# Patient Record
Sex: FEMALE | Race: WHITE | Employment: UNEMPLOYED | URBAN - METROPOLITAN AREA
[De-identification: names, ages, dates, MRNs, and addresses within clinical notes are randomized per-mention and may not be internally consistent; named-entity substitution may affect disease eponyms.]

---

## 2018-04-03 DIAGNOSIS — J01.90 ACUTE SINUSITIS, RECURRENCE NOT SPECIFIED, UNSPECIFIED LOCATION: ICD-10-CM

## 2018-04-03 RX ORDER — AZELASTINE 1 MG/ML
2 SPRAY, METERED NASAL 2 TIMES DAILY
Qty: 1 BOTTLE | Refills: 3 | Status: SHIPPED | OUTPATIENT
Start: 2018-04-03 | End: 2018-10-10 | Stop reason: SDUPTHER

## 2018-04-18 ENCOUNTER — OFFICE VISIT (OUTPATIENT)
Dept: FAMILY MEDICINE CLINIC | Age: 63
End: 2018-04-18
Payer: COMMERCIAL

## 2018-04-18 ENCOUNTER — HOSPITAL ENCOUNTER (OUTPATIENT)
Age: 63
Discharge: HOME | End: 2018-04-20
Payer: COMMERCIAL

## 2018-04-18 VITALS
WEIGHT: 140 LBS | RESPIRATION RATE: 18 BRPM | DIASTOLIC BLOOD PRESSURE: 78 MMHG | HEIGHT: 59 IN | OXYGEN SATURATION: 97 % | BODY MASS INDEX: 28.22 KG/M2 | SYSTOLIC BLOOD PRESSURE: 118 MMHG | HEART RATE: 70 BPM

## 2018-04-18 DIAGNOSIS — L98.9 SKIN LESION: ICD-10-CM

## 2018-04-18 DIAGNOSIS — Z98.890 HISTORY OF NISSEN FUNDOPLICATION: ICD-10-CM

## 2018-04-18 DIAGNOSIS — G47.00 INSOMNIA, UNSPECIFIED TYPE: Primary | ICD-10-CM

## 2018-04-18 DIAGNOSIS — I10 ESSENTIAL HYPERTENSION: ICD-10-CM

## 2018-04-18 DIAGNOSIS — M81.0 OSTEOPOROSIS, UNSPECIFIED OSTEOPOROSIS TYPE, UNSPECIFIED PATHOLOGICAL FRACTURE PRESENCE: ICD-10-CM

## 2018-04-18 DIAGNOSIS — Z79.899 ENCOUNTER FOR LONG-TERM CURRENT USE OF MEDICATION: ICD-10-CM

## 2018-04-18 DIAGNOSIS — K83.4 SPHINCTER OF ODDI DYSFUNCTION: ICD-10-CM

## 2018-04-18 LAB
AMPHETAMINE SCREEN, URINE: NOT DETECTED
BARBITURATE SCREEN URINE: NOT DETECTED
BENZODIAZEPINE SCREEN, URINE: NOT DETECTED
CANNABINOID SCREEN URINE: NOT DETECTED
COCAINE METABOLITE SCREEN URINE: NOT DETECTED
METHADONE SCREEN, URINE: NOT DETECTED
OPIATE SCREEN URINE: NOT DETECTED
PHENCYCLIDINE SCREEN URINE: NOT DETECTED
PROPOXYPHENE SCREEN: NOT DETECTED

## 2018-04-18 PROCEDURE — 99214 OFFICE O/P EST MOD 30 MIN: CPT | Performed by: FAMILY MEDICINE

## 2018-04-18 PROCEDURE — 80307 DRUG TEST PRSMV CHEM ANLYZR: CPT

## 2018-04-18 RX ORDER — ZOLPIDEM TARTRATE 10 MG/1
10 TABLET ORAL NIGHTLY PRN
Qty: 90 TABLET | Refills: 0 | Status: SHIPPED | OUTPATIENT
Start: 2018-04-18 | End: 2018-07-13 | Stop reason: SDUPTHER

## 2018-04-18 ASSESSMENT — ENCOUNTER SYMPTOMS
SHORTNESS OF BREATH: 0
PHOTOPHOBIA: 0
SORE THROAT: 0
SINUS PAIN: 0
EYE REDNESS: 0
EYE PAIN: 0
WHEEZING: 0
EYES NEGATIVE: 1
SPUTUM PRODUCTION: 0
DOUBLE VISION: 0
NAUSEA: 0
ORTHOPNEA: 0
EYE DISCHARGE: 0
HEARTBURN: 0
STRIDOR: 0
DIARRHEA: 0
VOMITING: 0
BLOOD IN STOOL: 0
CONSTIPATION: 0
COUGH: 0
BLURRED VISION: 0
HEMOPTYSIS: 0

## 2018-04-19 ASSESSMENT — PATIENT HEALTH QUESTIONNAIRE - PHQ9
2. FEELING DOWN, DEPRESSED OR HOPELESS: 0
SUM OF ALL RESPONSES TO PHQ QUESTIONS 1-9: 0
1. LITTLE INTEREST OR PLEASURE IN DOING THINGS: 0
SUM OF ALL RESPONSES TO PHQ9 QUESTIONS 1 & 2: 0

## 2018-07-13 ENCOUNTER — OFFICE VISIT (OUTPATIENT)
Dept: FAMILY MEDICINE CLINIC | Age: 63
End: 2018-07-13
Payer: COMMERCIAL

## 2018-07-13 ENCOUNTER — HOSPITAL ENCOUNTER (OUTPATIENT)
Age: 63
Discharge: HOME | End: 2018-07-15
Payer: COMMERCIAL

## 2018-07-13 VITALS
BODY MASS INDEX: 28.43 KG/M2 | OXYGEN SATURATION: 98 % | WEIGHT: 141 LBS | HEIGHT: 59 IN | RESPIRATION RATE: 18 BRPM | DIASTOLIC BLOOD PRESSURE: 70 MMHG | SYSTOLIC BLOOD PRESSURE: 112 MMHG | HEART RATE: 69 BPM

## 2018-07-13 DIAGNOSIS — R73.01 IFG (IMPAIRED FASTING GLUCOSE): ICD-10-CM

## 2018-07-13 DIAGNOSIS — G47.00 INSOMNIA, UNSPECIFIED TYPE: ICD-10-CM

## 2018-07-13 DIAGNOSIS — Z79.899 ENCOUNTER FOR LONG-TERM CURRENT USE OF MEDICATION: ICD-10-CM

## 2018-07-13 DIAGNOSIS — I10 ESSENTIAL HYPERTENSION: Primary | ICD-10-CM

## 2018-07-13 DIAGNOSIS — R53.83 FATIGUE, UNSPECIFIED TYPE: ICD-10-CM

## 2018-07-13 DIAGNOSIS — M17.11 PRIMARY OSTEOARTHRITIS OF RIGHT KNEE: ICD-10-CM

## 2018-07-13 DIAGNOSIS — E78.5 DYSLIPIDEMIA: ICD-10-CM

## 2018-07-13 PROCEDURE — 99214 OFFICE O/P EST MOD 30 MIN: CPT | Performed by: FAMILY MEDICINE

## 2018-07-13 PROCEDURE — 80307 DRUG TEST PRSMV CHEM ANLYZR: CPT

## 2018-07-13 RX ORDER — ZOLPIDEM TARTRATE 10 MG/1
10 TABLET ORAL NIGHTLY PRN
Qty: 90 TABLET | Refills: 0 | Status: SHIPPED | OUTPATIENT
Start: 2018-07-13 | End: 2018-10-10 | Stop reason: SDUPTHER

## 2018-07-13 RX ORDER — ATENOLOL 25 MG/1
25 TABLET ORAL 2 TIMES DAILY
Qty: 180 TABLET | Refills: 1 | Status: SHIPPED | OUTPATIENT
Start: 2018-07-13 | End: 2018-12-10 | Stop reason: SDUPTHER

## 2018-07-13 ASSESSMENT — ENCOUNTER SYMPTOMS
SPUTUM PRODUCTION: 0
STRIDOR: 0
VOMITING: 0
BLURRED VISION: 0
BLOOD IN STOOL: 0
EYES NEGATIVE: 1
HEMOPTYSIS: 0
SHORTNESS OF BREATH: 0
DOUBLE VISION: 0
PHOTOPHOBIA: 0
SINUS PAIN: 0
HEARTBURN: 0
DIARRHEA: 0
NAUSEA: 0
ORTHOPNEA: 0
CONSTIPATION: 0
ABDOMINAL PAIN: 0
WHEEZING: 0
COUGH: 0
SORE THROAT: 0
BACK PAIN: 0
EYE PAIN: 0
EYE REDNESS: 0
EYE DISCHARGE: 0

## 2018-07-13 ASSESSMENT — PATIENT HEALTH QUESTIONNAIRE - PHQ9
SUM OF ALL RESPONSES TO PHQ9 QUESTIONS 1 & 2: 0
2. FEELING DOWN, DEPRESSED OR HOPELESS: 0
SUM OF ALL RESPONSES TO PHQ QUESTIONS 1-9: 0
1. LITTLE INTEREST OR PLEASURE IN DOING THINGS: 0

## 2018-07-13 NOTE — PROGRESS NOTES
Tamy Avilez is a 58 y.o. female. HPI/Chief C/O:  Chief Complaint   Patient presents with    Insomnia     Pt here for refill of Ambien- UDS pended    Other     Pt would like to discuss getting a \"Life Scan\"- states that she saw a commercial for this on TV and wants to know if she should go do it     Allergies   Allergen Reactions    Molds & Smuts Swelling    Meperidine Nausea And Vomiting     She is here for a med list review and refills      Hypertension   This is a chronic problem. The current episode started more than 1 year ago. The problem is controlled. Associated symptoms include anxiety and malaise/fatigue. Pertinent negatives include no blurred vision, chest pain, headaches, neck pain, orthopnea, palpitations, peripheral edema, PND, shortness of breath or sweats. Risk factors for coronary artery disease include post-menopausal state and family history. Past treatments include lifestyle changes and beta blockers. The current treatment provides significant improvement. Compliance problems include exercise and diet. There is no history of angina, kidney disease, CAD/MI, CVA, heart failure, left ventricular hypertrophy, PVD or retinopathy. There is no history of chronic renal disease, coarctation of the aorta, hyperaldosteronism, hypercortisolism, hyperparathyroidism, a hypertension causing med, pheochromocytoma, renovascular disease, sleep apnea or a thyroid problem. ROS:  Review of Systems   Constitutional: Positive for malaise/fatigue. Negative for chills, diaphoresis, fever and weight loss. HENT: Negative for congestion, ear discharge, ear pain, hearing loss, nosebleeds, sinus pain, sore throat and tinnitus. Eyes: Negative. Negative for blurred vision, double vision, photophobia, pain, discharge and redness. Respiratory: Negative for cough, hemoptysis, sputum production, shortness of breath, wheezing and stridor. Cardiovascular: Negative.   Negative for chest pain, palpitations, orthopnea, claudication, leg swelling and PND. Gastrointestinal: Negative for abdominal pain, blood in stool, constipation, diarrhea, heartburn, melena, nausea and vomiting. Genitourinary: Negative. Negative for flank pain, frequency, hematuria and urgency. Musculoskeletal: Positive for joint pain (right knee pain). Negative for back pain, falls, myalgias and neck pain. Skin: Negative for itching and rash. Neurological: Positive for weakness. Negative for dizziness, tingling, tremors, sensory change, speech change, focal weakness, seizures, loss of consciousness and headaches. Endo/Heme/Allergies: Negative. Negative for environmental allergies and polydipsia. Does not bruise/bleed easily. Psychiatric/Behavioral: Positive for depression. Negative for hallucinations, memory loss, substance abuse and suicidal ideas. The patient is nervous/anxious. The patient does not have insomnia. Past Medical/Surgical Hx;  Reviewed with patient      Diagnosis Date    Asthma     GERD (gastroesophageal reflux disease)     Hernia     Hypertension      Past Surgical History:   Procedure Laterality Date    CARPAL TUNNEL RELEASE       SECTION      LASIK      NASAL SEPTUM SURGERY         Past Family Hx:  Reviewed with patient  Family History   Problem Relation Age of Onset    Stroke Mother     Heart Disease Mother     Heart Disease Father     Arthritis Sister     Glaucoma Brother        Social Hx:  Reviewed with patient  Social History   Substance Use Topics    Smoking status: Never Smoker    Smokeless tobacco: Never Used    Alcohol use No       OBJECTIVE  /70   Pulse 69   Resp 18   Ht 4' 11\" (1.499 m)   Wt 141 lb (64 kg)   LMP  (LMP Unknown)   SpO2 98%   Breastfeeding? No   BMI 28.48 kg/m²     Problem List:  Eunice  does not have any pertinent problems on file. PHYS EX:  Physical Exam   Constitutional: She is oriented to person, place, and time.  She appears

## 2018-09-24 LAB
ALBUMIN SERPL-MCNC: NORMAL G/DL
ALP BLD-CCNC: NORMAL U/L
ALT SERPL-CCNC: NORMAL U/L
ANION GAP SERPL CALCULATED.3IONS-SCNC: NORMAL MMOL/L
AST SERPL-CCNC: NORMAL U/L
BASOPHILS ABSOLUTE: NORMAL /ΜL
BASOPHILS RELATIVE PERCENT: NORMAL %
BILIRUB SERPL-MCNC: NORMAL MG/DL (ref 0.1–1.4)
BUN BLDV-MCNC: NORMAL MG/DL
CALCIUM SERPL-MCNC: NORMAL MG/DL
CHLORIDE BLD-SCNC: NORMAL MMOL/L
CHOLESTEROL, TOTAL: 178 MG/DL
CHOLESTEROL/HDL RATIO: 2.74
CO2: NORMAL MMOL/L
CREAT SERPL-MCNC: NORMAL MG/DL
EOSINOPHILS ABSOLUTE: NORMAL /ΜL
EOSINOPHILS RELATIVE PERCENT: NORMAL %
GFR CALCULATED: NORMAL
GLUCOSE BLD-MCNC: NORMAL MG/DL
HCT VFR BLD CALC: NORMAL % (ref 36–46)
HDLC SERPL-MCNC: 65 MG/DL (ref 35–70)
HEMOGLOBIN: NORMAL G/DL (ref 12–16)
LDL CHOLESTEROL CALCULATED: 101 MG/DL (ref 0–160)
LYMPHOCYTES ABSOLUTE: NORMAL /ΜL
LYMPHOCYTES RELATIVE PERCENT: NORMAL %
MCH RBC QN AUTO: NORMAL PG
MCHC RBC AUTO-ENTMCNC: NORMAL G/DL
MCV RBC AUTO: NORMAL FL
MONOCYTES ABSOLUTE: NORMAL /ΜL
MONOCYTES RELATIVE PERCENT: NORMAL %
NEUTROPHILS ABSOLUTE: NORMAL /ΜL
NEUTROPHILS RELATIVE PERCENT: NORMAL %
PDW BLD-RTO: NORMAL %
PLATELET # BLD: NORMAL K/ΜL
PMV BLD AUTO: NORMAL FL
POTASSIUM SERPL-SCNC: NORMAL MMOL/L
RBC # BLD: NORMAL 10^6/ΜL
SODIUM BLD-SCNC: NORMAL MMOL/L
TOTAL PROTEIN: NORMAL
TRIGL SERPL-MCNC: 62 MG/DL
TSH SERPL DL<=0.05 MIU/L-ACNC: NORMAL UIU/ML
VLDLC SERPL CALC-MCNC: NORMAL MG/DL
WBC # BLD: NORMAL 10^3/ML

## 2018-09-25 LAB
AVERAGE GLUCOSE: NORMAL
HBA1C MFR BLD: 5.1 %

## 2018-09-26 DIAGNOSIS — R53.83 FATIGUE, UNSPECIFIED TYPE: ICD-10-CM

## 2018-09-26 DIAGNOSIS — E78.5 DYSLIPIDEMIA: ICD-10-CM

## 2018-09-26 DIAGNOSIS — I10 ESSENTIAL HYPERTENSION: ICD-10-CM

## 2018-09-26 DIAGNOSIS — R73.01 IFG (IMPAIRED FASTING GLUCOSE): ICD-10-CM

## 2018-10-10 ENCOUNTER — HOSPITAL ENCOUNTER (OUTPATIENT)
Age: 63
Discharge: HOME | End: 2018-10-12
Payer: COMMERCIAL

## 2018-10-10 ENCOUNTER — OFFICE VISIT (OUTPATIENT)
Dept: FAMILY MEDICINE CLINIC | Age: 63
End: 2018-10-10
Payer: COMMERCIAL

## 2018-10-10 VITALS
WEIGHT: 137.6 LBS | SYSTOLIC BLOOD PRESSURE: 126 MMHG | BODY MASS INDEX: 27.74 KG/M2 | HEIGHT: 59 IN | HEART RATE: 66 BPM | RESPIRATION RATE: 18 BRPM | OXYGEN SATURATION: 97 % | DIASTOLIC BLOOD PRESSURE: 66 MMHG

## 2018-10-10 DIAGNOSIS — I10 ESSENTIAL HYPERTENSION: Primary | ICD-10-CM

## 2018-10-10 DIAGNOSIS — Z79.899 ENCOUNTER FOR LONG-TERM CURRENT USE OF MEDICATION: ICD-10-CM

## 2018-10-10 DIAGNOSIS — J32.9 SINUSITIS, UNSPECIFIED CHRONICITY, UNSPECIFIED LOCATION: ICD-10-CM

## 2018-10-10 DIAGNOSIS — Z23 IMMUNIZATION DUE: ICD-10-CM

## 2018-10-10 DIAGNOSIS — G47.00 INSOMNIA, UNSPECIFIED TYPE: ICD-10-CM

## 2018-10-10 DIAGNOSIS — J01.90 ACUTE SINUSITIS, RECURRENCE NOT SPECIFIED, UNSPECIFIED LOCATION: ICD-10-CM

## 2018-10-10 PROCEDURE — 90471 IMMUNIZATION ADMIN: CPT | Performed by: FAMILY MEDICINE

## 2018-10-10 PROCEDURE — 90686 IIV4 VACC NO PRSV 0.5 ML IM: CPT | Performed by: FAMILY MEDICINE

## 2018-10-10 PROCEDURE — 80307 DRUG TEST PRSMV CHEM ANLYZR: CPT

## 2018-10-10 PROCEDURE — 90472 IMMUNIZATION ADMIN EACH ADD: CPT | Performed by: FAMILY MEDICINE

## 2018-10-10 PROCEDURE — 90732 PPSV23 VACC 2 YRS+ SUBQ/IM: CPT | Performed by: FAMILY MEDICINE

## 2018-10-10 PROCEDURE — 99214 OFFICE O/P EST MOD 30 MIN: CPT | Performed by: FAMILY MEDICINE

## 2018-10-10 RX ORDER — FLUTICASONE PROPIONATE 50 MCG
1 SPRAY, SUSPENSION (ML) NASAL DAILY
Qty: 1 BOTTLE | Refills: 3 | Status: SHIPPED | OUTPATIENT
Start: 2018-10-10 | End: 2019-01-11 | Stop reason: CLARIF

## 2018-10-10 RX ORDER — AZELASTINE 1 MG/ML
2 SPRAY, METERED NASAL 2 TIMES DAILY
Qty: 1 BOTTLE | Refills: 3 | Status: SHIPPED | OUTPATIENT
Start: 2018-10-10 | End: 2019-04-16 | Stop reason: SDUPTHER

## 2018-10-10 RX ORDER — ZOLPIDEM TARTRATE 10 MG/1
10 TABLET ORAL NIGHTLY PRN
Qty: 90 TABLET | Refills: 0 | Status: SHIPPED | OUTPATIENT
Start: 2018-10-10 | End: 2019-01-11 | Stop reason: SDUPTHER

## 2018-10-10 ASSESSMENT — ENCOUNTER SYMPTOMS
BLOOD IN STOOL: 0
EYE REDNESS: 0
DIARRHEA: 0
BACK PAIN: 0
EYE PAIN: 0
DOUBLE VISION: 0
HEARTBURN: 0
SORE THROAT: 0
WHEEZING: 0
EYE DISCHARGE: 0
SINUS PAIN: 0
ABDOMINAL PAIN: 0
SPUTUM PRODUCTION: 0
COUGH: 0
BLURRED VISION: 0
NAUSEA: 0
SHORTNESS OF BREATH: 0
ORTHOPNEA: 0
EYES NEGATIVE: 1
CONSTIPATION: 0
VOMITING: 0
STRIDOR: 0
PHOTOPHOBIA: 0
HEMOPTYSIS: 0

## 2018-10-10 ASSESSMENT — PATIENT HEALTH QUESTIONNAIRE - PHQ9
SUM OF ALL RESPONSES TO PHQ9 QUESTIONS 1 & 2: 0
SUM OF ALL RESPONSES TO PHQ QUESTIONS 1-9: 0
1. LITTLE INTEREST OR PLEASURE IN DOING THINGS: 0
2. FEELING DOWN, DEPRESSED OR HOPELESS: 0
SUM OF ALL RESPONSES TO PHQ QUESTIONS 1-9: 0

## 2018-10-10 NOTE — PATIENT INSTRUCTIONS
Patient Education        Insomnia: Care Instructions  Your Care Instructions    Insomnia is the inability to sleep well. It is a common problem for most people at some time. Insomnia may make it hard for you to get to sleep, stay asleep, or sleep as long as you need to. This can make you tired and grouchy during the day. It can also make you forgetful, less effective at work, and unhappy. Insomnia can be caused by conditions such as depression or anxiety. Pain can also affect your ability to sleep. When these problems are solved, the insomnia usually clears up. But sometimes bad sleep habits can cause insomnia. If insomnia is affecting your work or your enjoyment of life, you can take steps to improve your sleep. Follow-up care is a key part of your treatment and safety. Be sure to make and go to all appointments, and call your doctor if you are having problems. It's also a good idea to know your test results and keep a list of the medicines you take. How can you care for yourself at home? What to avoid  · Do not have drinks with caffeine, such as coffee or black tea, for 8 hours before bed. · Do not smoke or use other types of tobacco near bedtime. Nicotine is a stimulant and can keep you awake. · Avoid drinking alcohol late in the evening, because it can cause you to wake in the middle of the night. · Do not eat a big meal close to bedtime. If you are hungry, eat a light snack. · Do not drink a lot of water close to bedtime, because the need to urinate may wake you up during the night. · Do not read or watch TV in bed. Use the bed only for sleeping and sexual activity. What to try  · Go to bed at the same time every night, and wake up at the same time every morning. Do not take naps during the day. · Keep your bedroom quiet, dark, and cool. · Sleep on a comfortable pillow and mattress. · If watching the clock makes you anxious, turn it facing away from you so you cannot see the time.   · If you nightly sleep time is 7½ to 8 hours. Healthy adults may need a little more or a little less than this. Why is getting enough sleep important? Getting enough quality sleep is a basic part of good health. When your sleep suffers, your mood and your thoughts can suffer too. You may find yourself feeling more grumpy or stressed. Not getting enough sleep also can lead to serious problems, including injury, accidents, anxiety, and depression. What might cause poor sleeping? Many things can cause sleep problems, including:  · Stress. Stress can be caused by fear about a single event, such as giving a speech. Or you may have ongoing stress, such as worry about work or school. · Depression, anxiety, and other mental or emotional conditions. · Changes in your sleep habits or surroundings. This includes changes that happen where you sleep, such as noise, light, or sleeping in a different bed. It also includes changes in your sleep pattern, such as having jet lag or working a late shift. · Health problems, such as pain, breathing problems, and restless legs syndrome. · Lack of regular exercise. How can you help yourself? Here are some tips that may help you sleep more soundly and wake up feeling more refreshed. Your sleeping area  · Use your bedroom only for sleeping and sex. A bit of light reading may help you fall asleep. But if it doesn't, do your reading elsewhere in the house. Don't watch TV in bed. · Be sure your bed is big enough to stretch out comfortably, especially if you have a sleep partner. · Keep your bedroom quiet, dark, and cool. Use curtains, blinds, or a sleep mask to block out light. To block out noise, use earplugs, soothing music, or a \"white noise\" machine. Your evening and bedtime routine  · Create a relaxing bedtime routine. You might want to take a warm shower or bath, listen to soothing music, or drink a cup of noncaffeinated tea. · Go to bed at the same time every night.  And get up at

## 2018-10-10 NOTE — PROGRESS NOTES
time. She appears well-developed and well-nourished. No distress. HENT:   Head: Normocephalic and atraumatic. Right Ear: External ear normal.   Left Ear: External ear normal.   Nose: Nose normal.   Mouth/Throat: Oropharynx is clear and moist. No oropharyngeal exudate. Eyes: Right eye exhibits no discharge. Left eye exhibits no discharge. No scleral icterus. Neck: Normal range of motion. Neck supple. No JVD present. No tracheal deviation present. No thyromegaly present. Cardiovascular: Normal rate, regular rhythm and normal heart sounds. Exam reveals no gallop and no friction rub. No murmur heard. Pulmonary/Chest: Effort normal and breath sounds normal. No stridor. No respiratory distress. She has no wheezes. She has no rales. She exhibits no tenderness. Abdominal: Soft. Normal appearance and bowel sounds are normal. She exhibits no shifting dullness, no distension, no pulsatile liver, no fluid wave, no abdominal bruit, no ascites, no pulsatile midline mass and no mass. There is no hepatosplenomegaly, splenomegaly or hepatomegaly. There is no tenderness. There is no rigidity, no rebound, no guarding, no CVA tenderness, no tenderness at McBurney's point and negative Nguyen's sign. No hernia. Hernia confirmed negative in the ventral area, confirmed negative in the right inguinal area and confirmed negative in the left inguinal area. Musculoskeletal: She exhibits tenderness. She exhibits no edema or deformity. Right knee: She exhibits decreased range of motion and bony tenderness. She exhibits no swelling, no effusion, no ecchymosis, no deformity, no laceration, no erythema, normal alignment, no LCL laxity, normal patellar mobility, normal meniscus and no MCL laxity. Tenderness found. No medial joint line, no lateral joint line, no MCL, no LCL and no patellar tendon tenderness noted. Legs:  Lymphadenopathy:     She has no cervical adenopathy.    Neurological: She is alert and oriented to importance of regular Health Maintenance follow up  Health Maintenance   Topic    Hepatitis C screen     HIV screen     Shingles Vaccine (1 of 2 - 2 Dose Series)    Flu vaccine (1)    Breast cancer screen     Lipid screen     Colon cancer screen colonoscopy     DTaP/Tdap/Td vaccine (2 - Td)

## 2018-10-15 ENCOUNTER — TELEPHONE (OUTPATIENT)
Dept: FAMILY MEDICINE CLINIC | Age: 63
End: 2018-10-15

## 2018-10-15 RX ORDER — FEXOFENADINE HCL 180 MG/1
180 TABLET ORAL DAILY
Qty: 90 TABLET | Refills: 1 | Status: CANCELLED | OUTPATIENT
Start: 2018-10-15

## 2018-10-15 NOTE — TELEPHONE ENCOUNTER
Patient inquiring if Caity Williamson is covered under her insurance. She is not sure if she wants it to go to [facility] or [facility].

## 2018-10-18 ENCOUNTER — OFFICE VISIT (OUTPATIENT)
Dept: FAMILY MEDICINE CLINIC | Age: 63
End: 2018-10-18
Payer: COMMERCIAL

## 2018-10-18 VITALS
DIASTOLIC BLOOD PRESSURE: 78 MMHG | OXYGEN SATURATION: 97 % | HEIGHT: 59 IN | BODY MASS INDEX: 27.82 KG/M2 | RESPIRATION RATE: 16 BRPM | WEIGHT: 138 LBS | HEART RATE: 73 BPM | SYSTOLIC BLOOD PRESSURE: 114 MMHG | TEMPERATURE: 98.7 F

## 2018-10-18 DIAGNOSIS — L23.9 ALLERGIC CONTACT DERMATITIS, UNSPECIFIED TRIGGER: Primary | ICD-10-CM

## 2018-10-18 PROCEDURE — 99213 OFFICE O/P EST LOW 20 MIN: CPT | Performed by: PHYSICIAN ASSISTANT

## 2018-10-18 RX ORDER — TRIAMCINOLONE ACETONIDE 40 MG/ML
40 INJECTION, SUSPENSION INTRA-ARTICULAR; INTRAMUSCULAR ONCE
Status: COMPLETED | OUTPATIENT
Start: 2018-10-18 | End: 2018-10-18

## 2018-10-18 RX ORDER — TRIAMCINOLONE ACETONIDE 1 MG/G
CREAM TOPICAL
Qty: 15 G | Refills: 0 | Status: SHIPPED | OUTPATIENT
Start: 2018-10-18 | End: 2019-04-16 | Stop reason: CLARIF

## 2018-10-18 RX ADMIN — TRIAMCINOLONE ACETONIDE 40 MG: 40 INJECTION, SUSPENSION INTRA-ARTICULAR; INTRAMUSCULAR at 11:03

## 2018-10-18 NOTE — PROGRESS NOTES
Chief Complaint:   Rash (Red and itchy, on arms and neck. Tried Cortisone and Desitin x 5 days, had flu and pneumonia shot last week, went camping)      History of Present Illness   Source of history provided by: patient. Elijah Webb is a 58 y.o. old female who has a past medical history of:   Past Medical History:   Diagnosis Date    Asthma     GERD (gastroesophageal reflux disease)     Hernia     Hypertension    Presents to the [facility] care for evaluation of a rash to the neck, chest, and bilateral forearms x 5 days. Denies any known cause for the rash. Pt was recently camping and cleaning her camper. She also reports recently getting a flu shot and pneumonia shot as well. Reports associated erythema, mild burning, and pruritis. Denies any bleeding or drainage. Denies any lymphangitic streaking, fever, chills, HA , dyspnea, dysphagia, recent illness, myalgias, vomiting, or lethargy. ROS    Unless otherwise stated in this report or unable to obtain because of the patient's clinical or mental status as evidenced by the medical record, this patients's positive and negative responses for Review of Systems, constitutional, psych, eyes, ENT, cardiovascular, respiratory, gastrointestinal, neurological, genitourinary, musculoskeletal, integument systems and systems related to the presenting problem are either stated in the preceding or were not pertinent or were negative for the symptoms and/or complaints related to the medical problem. Past Surgical History:  has a past surgical history that includes Nasal septum surgery;  section; LASIK; and Carpal tunnel release. Social History:  reports that she has never smoked. She has never used smokeless tobacco. She reports that she does not drink alcohol. Family History: family history includes Arthritis in her sister; Glaucoma in her brother; Heart Disease in her father and mother; Stroke in her mother.    Allergies: Molds & smuts and

## 2018-12-10 DIAGNOSIS — I10 ESSENTIAL HYPERTENSION: ICD-10-CM

## 2018-12-10 RX ORDER — ATENOLOL 50 MG/1
50 TABLET ORAL DAILY
Qty: 90 TABLET | Refills: 1 | Status: SHIPPED | OUTPATIENT
Start: 2018-12-10 | End: 2019-01-11 | Stop reason: SDUPTHER

## 2019-01-11 ENCOUNTER — OFFICE VISIT (OUTPATIENT)
Dept: FAMILY MEDICINE CLINIC | Age: 64
End: 2019-01-11
Payer: COMMERCIAL

## 2019-01-11 ENCOUNTER — HOSPITAL ENCOUNTER (OUTPATIENT)
Age: 64
Discharge: HOME | End: 2019-01-13
Payer: COMMERCIAL

## 2019-01-11 VITALS
HEART RATE: 60 BPM | DIASTOLIC BLOOD PRESSURE: 82 MMHG | BODY MASS INDEX: 27.82 KG/M2 | SYSTOLIC BLOOD PRESSURE: 124 MMHG | RESPIRATION RATE: 18 BRPM | TEMPERATURE: 97.9 F | WEIGHT: 138 LBS | HEIGHT: 59 IN | OXYGEN SATURATION: 97 %

## 2019-01-11 DIAGNOSIS — Z79.899 ENCOUNTER FOR LONG-TERM CURRENT USE OF MEDICATION: ICD-10-CM

## 2019-01-11 DIAGNOSIS — M81.0 OSTEOPOROSIS, UNSPECIFIED OSTEOPOROSIS TYPE, UNSPECIFIED PATHOLOGICAL FRACTURE PRESENCE: ICD-10-CM

## 2019-01-11 DIAGNOSIS — R73.01 IFG (IMPAIRED FASTING GLUCOSE): ICD-10-CM

## 2019-01-11 DIAGNOSIS — E78.5 DYSLIPIDEMIA: ICD-10-CM

## 2019-01-11 DIAGNOSIS — I10 ESSENTIAL HYPERTENSION: Primary | ICD-10-CM

## 2019-01-11 DIAGNOSIS — Z12.31 SCREENING MAMMOGRAM, ENCOUNTER FOR: ICD-10-CM

## 2019-01-11 DIAGNOSIS — G47.00 INSOMNIA, UNSPECIFIED TYPE: ICD-10-CM

## 2019-01-11 DIAGNOSIS — R53.83 FATIGUE, UNSPECIFIED TYPE: ICD-10-CM

## 2019-01-11 PROCEDURE — 99213 OFFICE O/P EST LOW 20 MIN: CPT | Performed by: FAMILY MEDICINE

## 2019-01-11 PROCEDURE — 80307 DRUG TEST PRSMV CHEM ANLYZR: CPT

## 2019-01-11 RX ORDER — ATENOLOL 50 MG/1
50 TABLET ORAL DAILY
Qty: 90 TABLET | Refills: 1 | Status: SHIPPED | OUTPATIENT
Start: 2019-01-11 | End: 2019-04-16 | Stop reason: SDUPTHER

## 2019-01-11 RX ORDER — ZOLPIDEM TARTRATE 10 MG/1
10 TABLET ORAL NIGHTLY PRN
Qty: 90 TABLET | Refills: 0 | Status: SHIPPED | OUTPATIENT
Start: 2019-01-11 | End: 2019-04-11

## 2019-01-11 ASSESSMENT — ENCOUNTER SYMPTOMS
SHORTNESS OF BREATH: 0
EYE PAIN: 0
VOMITING: 0
COUGH: 0
BACK PAIN: 1
NAUSEA: 0
PHOTOPHOBIA: 0
CHOKING: 0
EYE ITCHING: 0
RESPIRATORY NEGATIVE: 1
SINUS PAIN: 0
APNEA: 0
DIARRHEA: 0
SINUS PRESSURE: 0
ABDOMINAL PAIN: 0
ABDOMINAL DISTENTION: 0
VOICE CHANGE: 0
BLURRED VISION: 0
EYE DISCHARGE: 0
GASTROINTESTINAL NEGATIVE: 1
CHEST TIGHTNESS: 0
RECTAL PAIN: 0
COLOR CHANGE: 0
ANAL BLEEDING: 0
STRIDOR: 0
BLOOD IN STOOL: 0
EYE REDNESS: 0
CONSTIPATION: 0
ALLERGIC/IMMUNOLOGIC NEGATIVE: 1
FACIAL SWELLING: 0
SORE THROAT: 0
TROUBLE SWALLOWING: 0
RHINORRHEA: 0
ORTHOPNEA: 0
WHEEZING: 0

## 2019-02-25 ENCOUNTER — HOSPITAL ENCOUNTER (OUTPATIENT)
Dept: GENERAL RADIOLOGY | Age: 64
Discharge: HOME | End: 2019-02-27
Payer: COMMERCIAL

## 2019-02-25 DIAGNOSIS — Z12.31 SCREENING MAMMOGRAM, ENCOUNTER FOR: ICD-10-CM

## 2019-02-25 DIAGNOSIS — M81.0 OSTEOPOROSIS, UNSPECIFIED OSTEOPOROSIS TYPE, UNSPECIFIED PATHOLOGICAL FRACTURE PRESENCE: Primary | ICD-10-CM

## 2019-02-25 DIAGNOSIS — M81.0 OSTEOPOROSIS, UNSPECIFIED OSTEOPOROSIS TYPE, UNSPECIFIED PATHOLOGICAL FRACTURE PRESENCE: ICD-10-CM

## 2019-02-25 PROCEDURE — 77063 BREAST TOMOSYNTHESIS BI: CPT

## 2019-02-25 PROCEDURE — 77080 DXA BONE DENSITY AXIAL: CPT

## 2019-02-26 ENCOUNTER — TELEPHONE (OUTPATIENT)
Dept: FAMILY MEDICINE CLINIC | Age: 64
End: 2019-02-26

## 2019-04-10 ENCOUNTER — TELEPHONE (OUTPATIENT)
Dept: FAMILY MEDICINE CLINIC | Age: 64
End: 2019-04-10

## 2019-04-10 NOTE — TELEPHONE ENCOUNTER
Patient contacted office stating she just got back from vacation she did scheduled appointment for 4/16.  Patient wants to know if she can have script for4 pills of Ambien until she is seen

## 2019-04-16 ENCOUNTER — HOSPITAL ENCOUNTER (OUTPATIENT)
Age: 64
Discharge: HOME | End: 2019-04-18
Payer: COMMERCIAL

## 2019-04-16 ENCOUNTER — OFFICE VISIT (OUTPATIENT)
Dept: FAMILY MEDICINE CLINIC | Age: 64
End: 2019-04-16
Payer: COMMERCIAL

## 2019-04-16 VITALS
TEMPERATURE: 97.4 F | RESPIRATION RATE: 18 BRPM | WEIGHT: 138.6 LBS | HEIGHT: 59 IN | BODY MASS INDEX: 27.94 KG/M2 | DIASTOLIC BLOOD PRESSURE: 70 MMHG | HEART RATE: 60 BPM | OXYGEN SATURATION: 97 % | SYSTOLIC BLOOD PRESSURE: 122 MMHG

## 2019-04-16 DIAGNOSIS — J45.20 MILD INTERMITTENT ASTHMA WITHOUT COMPLICATION: ICD-10-CM

## 2019-04-16 DIAGNOSIS — F51.01 PRIMARY INSOMNIA: ICD-10-CM

## 2019-04-16 DIAGNOSIS — F51.01 PRIMARY INSOMNIA: Primary | ICD-10-CM

## 2019-04-16 DIAGNOSIS — T78.40XD ALLERGIC STATE, SUBSEQUENT ENCOUNTER: ICD-10-CM

## 2019-04-16 DIAGNOSIS — I10 ESSENTIAL HYPERTENSION: ICD-10-CM

## 2019-04-16 DIAGNOSIS — K21.9 GASTROESOPHAGEAL REFLUX DISEASE WITHOUT ESOPHAGITIS: ICD-10-CM

## 2019-04-16 PROBLEM — T78.40XA ALLERGIC STATE: Status: ACTIVE | Noted: 2019-04-16

## 2019-04-16 PROBLEM — E66.3 OVERWEIGHT (BMI 25.0-29.9): Status: ACTIVE | Noted: 2019-04-16

## 2019-04-16 PROCEDURE — 99214 OFFICE O/P EST MOD 30 MIN: CPT | Performed by: FAMILY MEDICINE

## 2019-04-16 PROCEDURE — 80307 DRUG TEST PRSMV CHEM ANLYZR: CPT

## 2019-04-16 RX ORDER — AZELASTINE 1 MG/ML
2 SPRAY, METERED NASAL 2 TIMES DAILY
Qty: 1 BOTTLE | Refills: 3 | Status: SHIPPED | OUTPATIENT
Start: 2019-04-16 | End: 2019-04-17

## 2019-04-16 RX ORDER — ZOLPIDEM TARTRATE 10 MG/1
10 TABLET ORAL NIGHTLY PRN
Qty: 30 TABLET | Refills: 0 | Status: SHIPPED | OUTPATIENT
Start: 2019-04-16 | End: 2019-05-16

## 2019-04-16 RX ORDER — ATENOLOL 50 MG/1
50 TABLET ORAL DAILY
Qty: 90 TABLET | Refills: 1 | Status: SHIPPED | OUTPATIENT
Start: 2019-04-16 | End: 2019-10-12 | Stop reason: SDUPTHER

## 2019-04-16 ASSESSMENT — PATIENT HEALTH QUESTIONNAIRE - PHQ9
SUM OF ALL RESPONSES TO PHQ QUESTIONS 1-9: 0
SUM OF ALL RESPONSES TO PHQ9 QUESTIONS 1 & 2: 0
2. FEELING DOWN, DEPRESSED OR HOPELESS: 0
SUM OF ALL RESPONSES TO PHQ QUESTIONS 1-9: 0
1. LITTLE INTEREST OR PLEASURE IN DOING THINGS: 0

## 2019-04-16 NOTE — PROGRESS NOTES
[facility])  Family Medicine Outpatient        SUBJECTIVE:  CC: had concerns including Medication Refill (Pt here today for medication refills- meds pended). Rigoberto Barrett presented to the clinic for a routine visit. Eunice is a 58year old female presenting to the office today to follow up on her sleep medicine. It appears on chart review that she has been on Ambien since 2017. The patient was advised that she needed to start weaning off the medication per pcp and was offered referral to the sleep medicine specialist. She reports that her previous sleep issues started in 2015 s/p hiatal hernia repair with complications requiring further surgical procedures. Now, her mind \"just doesn't stop\" at night. She has trialed zzzquil otc, but reports it \"puts me in a fog. \"  Per [place] review last refill was 1/11/19 for 90 days. The patient reports that she has been breaking the 10 mg tablet in half to \"help make it last.\" On 5 mg tablet she reports sleeping approximately 3.5-4 hours, but on the 10 mg is sleeping 8 hours. Review of Systems   Constitutional: Negative for appetite change, fatigue and fever. Respiratory: Negative for cough, shortness of breath and wheezing. Cardiovascular: Negative for chest pain and palpitations. Gastrointestinal: Negative for abdominal pain, constipation, diarrhea, nausea and vomiting. Acid reflux   Neurological: Negative for dizziness, syncope and headaches. Psychiatric/Behavioral: Positive for sleep disturbance. Negative for behavioral problems, hallucinations and suicidal ideas. Outpatient Medications Marked as Taking for the 4/16/19 encounter (Office Visit) with Vianca Montalvo MD   Medication Sig Dispense Refill    atenolol (TENORMIN) 50 MG tablet Take 1 tablet by mouth daily 90 tablet 1    zolpidem (AMBIEN) 10 MG tablet Take 1 tablet by mouth nightly as needed for Sleep (wean back to 5mg as tolerated) for up to 30 days.  30 tablet 0    [DISCONTINUED] azelastine (ASTELIN) 0.1 % nasal spray 2 sprays by Nasal route 2 times daily Use in each nostril as directed 1 Bottle 3    albuterol sulfate HFA (VENTOLIN HFA) 108 (90 BASE) MCG/ACT inhaler Inhale 2 puffs into the lungs every 6 hours as needed for Wheezing 3 Inhaler 1    beclomethasone (QVAR) 80 MCG/ACT inhaler Inhale 1 puff into the lungs 2 times daily 3 Inhaler 1       I have reviewed all pertinent PMHx, PSHx, FamHx, SocialHx, medications, and allergies and updated history as appropriate. OBJECTIVE    VS: /70   Pulse 60   Temp 97.4 °F (36.3 °C)   Resp 18   Ht 4' 11.02\" (1.499 m)   Wt 138 lb 9.6 oz (62.9 kg)   LMP  (LMP Unknown)   SpO2 97%   Breastfeeding? No   BMI 27.98 kg/m²   Physical Exam   Constitutional: She appears well-developed and well-nourished. No distress. HENT:   Head: Normocephalic and atraumatic. Eyes: Pupils are equal, round, and reactive to light. EOM are normal.   Cardiovascular: Normal rate and regular rhythm. Pulmonary/Chest: Effort normal and breath sounds normal.   Abdominal: Soft. She exhibits no distension. There is no tenderness. Skin: Skin is warm and dry. She is not diaphoretic. ASSESSMENT/PLAN:  1. Primary insomnia  Case discussed with pcp and patient. Patient agrees to see sleep specialist at this time for further evaluation and treatment. Will write Ambien script at this time for 10 mg/qhs for one month without refills. Patient is instructed to wean to 5 mg/qhs as tolerated. Discussed and provided sleep hygiene education. Oarrs report reviewed today. UDS obtained today. Consider etiology of insomnia could be psychological.  - Obed Mcqueen MD, Sleep Medicine, [place]  - URINE DRUG SCREEN; Future  - zolpidem (AMBIEN) 10 MG tablet; Take 1 tablet by mouth nightly as needed for Sleep (wean back to 5mg as tolerated) for up to 30 days. Dispense: 30 tablet; Refill: 0    2.  Essential hypertension  Stable; continue current medication regimen at this time. - atenolol (TENORMIN) 50 MG tablet; Take 1 tablet by mouth daily  Dispense: 90 tablet; Refill: 1    3. Allergic state, subsequent encounter  Continue daily otc Allegra at this time. 4. Mild intermittent asthma without complication  Stable; patient reports she only uses Qvar and Albuterol prn approximately 2x a year. 5. Gastroesophageal reflux disease without esophagitis  Recommend otc ppi at this time. Continue to monitor. I have reviewed my findings and recommendations with Eunice Al MD  4/17/2019 10:19 AM      Please note this report has been partially produced using speech recognition software  and may contain errors related to that system including grammar, punctuation and spelling as well as words and phrases that may seem inappropriate. If there are questions or concerns please feel free to contact me to clarify.

## 2019-04-17 ENCOUNTER — TELEPHONE (OUTPATIENT)
Dept: ADMINISTRATIVE | Age: 64
End: 2019-04-17

## 2019-04-17 ENCOUNTER — TELEPHONE (OUTPATIENT)
Dept: FAMILY MEDICINE CLINIC | Age: 64
End: 2019-04-17

## 2019-04-17 ASSESSMENT — ENCOUNTER SYMPTOMS
NAUSEA: 0
WHEEZING: 0
ABDOMINAL PAIN: 0
SHORTNESS OF BREATH: 0
VOMITING: 0
DIARRHEA: 0
CONSTIPATION: 0
COUGH: 0

## 2019-04-18 NOTE — TELEPHONE ENCOUNTER
It is not her fault  That is why we need a sleep specialist , we finally have one in town, to get her the proper help  Taking Ambien is not the proper way this should be continued
Patient should schedule a follow up in 3 weeks to review sleep hygiene and Onita Frohlich use with anticipation of titration down on ambien use with [place], or if not available I can see her. Will discuss extension at this time. Also, please contact Dr. Jeremiah Rueda office to see if she can get patient in earlier than 7/3. Thank you.
for further refills. Pt stated again that she did not feel it was necessary to be seen for this, and did not understand why the office will not give her a 3 month supply as she has been given it in the past. MA advised that as part of the process of her weaning off of Ambien, it is the do-diligence of the doctors to go through the proper channels to prescribe a controlled substance like Ambien, which includes a face-to-face visit, and submission of a urine drug screen. Pt became silent for a few moments, and then agreed to schedule with Dr. Tiffany Hernandez for a 3 week visit. Pt scheduled for 05/07/2019 @9:00AM. Pt verbalized x2 that she understood appointment date and time.  Phone call lasted 10 minutes total.    Electronically signed by Dior Tyler MA on 4/18/19 at 10:03 AM

## 2019-05-01 NOTE — TELEPHONE ENCOUNTER
I am just now getting this message as pool was not attached to me. Unfortunately there are no openings sooner than 7/3/19 at this time.

## 2019-05-29 ENCOUNTER — OFFICE VISIT (OUTPATIENT)
Dept: FAMILY MEDICINE CLINIC | Age: 64
End: 2019-05-29
Payer: COMMERCIAL

## 2019-05-29 VITALS
BODY MASS INDEX: 28.22 KG/M2 | WEIGHT: 140 LBS | TEMPERATURE: 98 F | DIASTOLIC BLOOD PRESSURE: 80 MMHG | HEIGHT: 59 IN | OXYGEN SATURATION: 95 % | RESPIRATION RATE: 18 BRPM | HEART RATE: 63 BPM | SYSTOLIC BLOOD PRESSURE: 122 MMHG

## 2019-05-29 DIAGNOSIS — F51.01 PRIMARY INSOMNIA: ICD-10-CM

## 2019-05-29 DIAGNOSIS — I10 ESSENTIAL HYPERTENSION: Primary | ICD-10-CM

## 2019-05-29 DIAGNOSIS — F41.9 ANXIETY: ICD-10-CM

## 2019-05-29 DIAGNOSIS — R10.84 GENERALIZED ABDOMINAL PAIN: ICD-10-CM

## 2019-05-29 LAB
ALBUMIN SERPL-MCNC: NORMAL G/DL
ALP BLD-CCNC: NORMAL U/L
ALT SERPL-CCNC: NORMAL U/L
ANION GAP SERPL CALCULATED.3IONS-SCNC: NORMAL MMOL/L
AST SERPL-CCNC: NORMAL U/L
AVERAGE GLUCOSE: NORMAL
BASOPHILS ABSOLUTE: NORMAL /ΜL
BASOPHILS RELATIVE PERCENT: NORMAL %
BILIRUB SERPL-MCNC: NORMAL MG/DL (ref 0.1–1.4)
BUN BLDV-MCNC: NORMAL MG/DL
CALCIUM SERPL-MCNC: NORMAL MG/DL
CHLORIDE BLD-SCNC: NORMAL MMOL/L
CHOLESTEROL, TOTAL: 193 MG/DL
CHOLESTEROL/HDL RATIO: 2.64
CO2: NORMAL MMOL/L
CREAT SERPL-MCNC: NORMAL MG/DL
EOSINOPHILS ABSOLUTE: NORMAL /ΜL
EOSINOPHILS RELATIVE PERCENT: NORMAL %
GFR CALCULATED: NORMAL
GLUCOSE BLD-MCNC: NORMAL MG/DL
HBA1C MFR BLD: 4.9 %
HCT VFR BLD CALC: NORMAL % (ref 36–46)
HDLC SERPL-MCNC: 73 MG/DL (ref 35–70)
HEMOGLOBIN: NORMAL G/DL (ref 12–16)
LDL CHOLESTEROL CALCULATED: 102 MG/DL (ref 0–160)
LYMPHOCYTES ABSOLUTE: NORMAL /ΜL
LYMPHOCYTES RELATIVE PERCENT: NORMAL %
MCH RBC QN AUTO: NORMAL PG
MCHC RBC AUTO-ENTMCNC: NORMAL G/DL
MCV RBC AUTO: NORMAL FL
MONOCYTES ABSOLUTE: NORMAL /ΜL
MONOCYTES RELATIVE PERCENT: NORMAL %
NEUTROPHILS ABSOLUTE: NORMAL /ΜL
NEUTROPHILS RELATIVE PERCENT: NORMAL %
PDW BLD-RTO: NORMAL %
PLATELET # BLD: NORMAL K/ΜL
PMV BLD AUTO: NORMAL FL
POTASSIUM SERPL-SCNC: NORMAL MMOL/L
RBC # BLD: NORMAL 10^6/ΜL
SODIUM BLD-SCNC: NORMAL MMOL/L
TOTAL PROTEIN: NORMAL
TRIGL SERPL-MCNC: 89 MG/DL
TSH SERPL DL<=0.05 MIU/L-ACNC: NORMAL UIU/ML
VLDLC SERPL CALC-MCNC: ABNORMAL MG/DL
WBC # BLD: NORMAL 10^3/ML

## 2019-05-29 PROCEDURE — 99213 OFFICE O/P EST LOW 20 MIN: CPT | Performed by: FAMILY MEDICINE

## 2019-05-29 RX ORDER — ZOLPIDEM TARTRATE 5 MG/1
TABLET ORAL
Qty: 30 TABLET | Refills: 0 | Status: SHIPPED | OUTPATIENT
Start: 2019-05-29 | End: 2019-06-29

## 2019-05-29 ASSESSMENT — ENCOUNTER SYMPTOMS
ABDOMINAL PAIN: 1
PHOTOPHOBIA: 0
RESPIRATORY NEGATIVE: 1
SINUS PRESSURE: 0
FACIAL SWELLING: 0
APNEA: 0
ANAL BLEEDING: 0
EYE DISCHARGE: 0
ABDOMINAL DISTENTION: 0
CHEST TIGHTNESS: 0
SHORTNESS OF BREATH: 0
COUGH: 0
CONSTIPATION: 0
EYE PAIN: 0
EYE REDNESS: 0
RECTAL PAIN: 0
SINUS PAIN: 0
NAUSEA: 0
COLOR CHANGE: 0
ORTHOPNEA: 0
BACK PAIN: 1
VOICE CHANGE: 0
SORE THROAT: 0
CHOKING: 0
TROUBLE SWALLOWING: 0
BLOOD IN STOOL: 0
BLURRED VISION: 0
VOMITING: 0
ALLERGIC/IMMUNOLOGIC NEGATIVE: 1
DIARRHEA: 0
WHEEZING: 0
RHINORRHEA: 0
STRIDOR: 0
EYE ITCHING: 0

## 2019-05-29 NOTE — PROGRESS NOTES
Lennox Velasquez is a 58 y.o. female. HPI/Chief C/O:  Chief Complaint   Patient presents with    Discuss Medications     Pt here to discuss Ambien refill - scheduled with Sleep Medicine 07/03     Allergies   Allergen Reactions    Molds & Smuts Swelling    Meperidine Nausea And Vomiting   The patient is here for a medication list and treatment planning review  We will go over our care planning goals as well as take care of all refills  She is here to discuss her sleep disorder  She has a visit with specialist on July 3rd    Hypertension   This is a chronic problem. The current episode started more than 1 year ago. The problem is controlled. Associated symptoms include anxiety. Pertinent negatives include no blurred vision, chest pain, headaches, malaise/fatigue, neck pain, orthopnea, palpitations, peripheral edema, PND, shortness of breath or sweats. Risk factors for coronary artery disease include post-menopausal state and family history. Past treatments include lifestyle changes and beta blockers. The current treatment provides significant improvement. Compliance problems include exercise and diet. There is no history of angina, kidney disease, CAD/MI, CVA, heart failure, left ventricular hypertrophy, PVD or retinopathy. There is no history of chronic renal disease, coarctation of the aorta, hyperaldosteronism, hypercortisolism, hyperparathyroidism, a hypertension causing med, pheochromocytoma, renovascular disease, sleep apnea or a thyroid problem. ROS:  Review of Systems   Constitutional: Negative. Negative for activity change, appetite change, chills, diaphoresis, fatigue, fever, malaise/fatigue and unexpected weight change. HENT: Negative.   Negative for congestion, dental problem, drooling, ear discharge, ear pain, facial swelling, hearing loss, mouth sores, nosebleeds, postnasal drip, rhinorrhea, sinus pressure, sinus pain, sneezing, sore throat, tinnitus, trouble swallowing and Past Family Hx:  Reviewed with patient      Problem Relation Age of Onset    Stroke Mother     Heart Disease Mother     Heart Disease Father     Arthritis Sister     Glaucoma Brother        Social Hx:  Reviewed with patient  Social History     Tobacco Use    Smoking status: Never Smoker    Smokeless tobacco: Never Used   Substance Use Topics    Alcohol use: No       OBJECTIVE  /80   Pulse 63   Temp 98 °F (36.7 °C) (Temporal)   Resp 18   Ht 4' 11\" (1.499 m)   Wt 140 lb (63.5 kg)   LMP  (LMP Unknown)   SpO2 95%   Breastfeeding? No   BMI 28.28 kg/m²     Problem List:  Eunice does not have any pertinent problems on file. PHYS EX:  Physical Exam   Constitutional: She is oriented to person, place, and time. She appears well-developed and well-nourished. No distress. HENT:   Head: Normocephalic and atraumatic. Right Ear: External ear normal.   Left Ear: External ear normal.   Nose: Nose normal.   Mouth/Throat: Oropharynx is clear and moist. No oropharyngeal exudate. Eyes: Right eye exhibits no discharge. Left eye exhibits no discharge. No scleral icterus. Neck: Normal range of motion. Neck supple. No JVD present. No tracheal deviation present. No thyromegaly present. Cardiovascular: Normal rate, regular rhythm and normal heart sounds. Exam reveals no gallop and no friction rub. No murmur heard. Pulmonary/Chest: Effort normal and breath sounds normal. No stridor. No respiratory distress. She has no wheezes. She has no rales. She exhibits no tenderness. Abdominal: Soft. Normal appearance and bowel sounds are normal. She exhibits no shifting dullness, no distension, no pulsatile liver, no fluid wave, no abdominal bruit, no ascites, no pulsatile midline mass and no mass. There is no hepatosplenomegaly, splenomegaly or hepatomegaly. There is tenderness (generalized pains).  There is no rigidity, no rebound, no guarding, no CVA tenderness, no tenderness at McBurney's point and negative Nguyen's sign. No hernia. Hernia confirmed negative in the ventral area, confirmed negative in the right inguinal area and confirmed negative in the left inguinal area. Musculoskeletal: She exhibits tenderness. She exhibits no edema or deformity. Right knee: She exhibits decreased range of motion and bony tenderness. She exhibits no swelling, no effusion, no ecchymosis, no deformity, no laceration, no erythema, normal alignment, no LCL laxity, normal patellar mobility, normal meniscus and no MCL laxity. Tenderness found. No medial joint line, no lateral joint line, no MCL, no LCL and no patellar tendon tenderness noted. Legs:  Lymphadenopathy:     She has no cervical adenopathy. Neurological: She is alert and oriented to person, place, and time. She has normal reflexes. She displays normal reflexes. No cranial nerve deficit or sensory deficit. She exhibits normal muscle tone. Coordination normal.   Skin: Skin is warm. No rash noted. She is not diaphoretic. No erythema. No pallor. Nursing note and vitals reviewed. ASSESSMENT/PLAN  Eunice was seen today for discuss medications. Diagnoses and all orders for this visit:    Essential hypertension ---controlled   --patient is instructed on low to moderate sodium ( 2 to 2.5 grams ), daily    Also to increase potassium in the diet to about 3.5 grams daily    Literature is provided   ---CARDIAC---asa, ace, BETA, statin, hctz, ( ccb )    Primary insomnia  -     zolpidem (AMBIEN) 5 MG tablet;  Take one every other night for sleep  --visit with sleep consult on July 3rd     Anxiety  Long talk on treatment and prevention  Literature is given       Generalized abdominal pain  -     External Referral To Gastroenterology        Outpatient Encounter Medications as of 5/29/2019   Medication Sig Dispense Refill    zolpidem (AMBIEN) 5 MG tablet Take one every other night for sleep 30 tablet 0    atenolol (TENORMIN) 50 MG tablet Take 1 tablet by mouth daily 90 tablet 1    albuterol sulfate HFA (VENTOLIN HFA) 108 (90 BASE) MCG/ACT inhaler Inhale 2 puffs into the lungs every 6 hours as needed for Wheezing 3 Inhaler 1    beclomethasone (QVAR) 80 MCG/ACT inhaler Inhale 1 puff into the lungs 2 times daily 3 Inhaler 1     No facility-administered encounter medications on file as of 5/29/2019. Return in about 3 months (around 8/29/2019).         Reviewed recent labs related to Eunice's current problems      Discussed importance of regular Health Maintenance follow up  Health Maintenance   Topic    Hepatitis C screen     HIV screen     Shingles Vaccine (2 of 2)    Breast cancer screen     Lipid screen     Colon cancer screen colonoscopy     DTaP/Tdap/Td vaccine (2 - Td)    Flu vaccine     Pneumococcal 0-64 years Vaccine

## 2019-05-29 NOTE — PATIENT INSTRUCTIONS
Patient Education        High Blood Pressure: Care Instructions  Overview    It's normal for blood pressure to go up and down throughout the day. But if it stays up, you have high blood pressure. Another name for high blood pressure is hypertension. Despite what a lot of people think, high blood pressure usually doesn't cause headaches or make you feel dizzy or lightheaded. It usually has no symptoms. But it does increase your risk of stroke, heart attack, and other problems. You and your doctor will talk about your risks of these problems based on your blood pressure. Your doctor will give you a goal for your blood pressure. Your goal will be based on your health and your age. Lifestyle changes, such as eating healthy and being active, are always important to help lower blood pressure. You might also take medicine to reach your blood pressure goal.  Follow-up care is a key part of your treatment and safety. Be sure to make and go to all appointments, and call your doctor if you are having problems. It's also a good idea to know your test results and keep a list of the medicines you take. How can you care for yourself at home? Medical treatment  · If you stop taking your medicine, your blood pressure will go back up. You may take one or more types of medicine to lower your blood pressure. Be safe with medicines. Take your medicine exactly as prescribed. Call your doctor if you think you are having a problem with your medicine. · Talk to your doctor before you start taking aspirin every day. Aspirin can help certain people lower their risk of a heart attack or stroke. But taking aspirin isn't right for everyone, because it can cause serious bleeding. · See your doctor regularly. You may need to see the doctor more often at first or until your blood pressure comes down.   · If you are taking blood pressure medicine, talk to your doctor before you take decongestants or anti-inflammatory medicine, such as ibuprofen. Some of these medicines can raise blood pressure. · Learn how to check your blood pressure at home. Lifestyle changes  · Stay at a healthy weight. This is especially important if you put on weight around the waist. Losing even 10 pounds can help you lower your blood pressure. · If your doctor recommends it, get more exercise. Walking is a good choice. Bit by bit, increase the amount you walk every day. Try for at least 30 minutes on most days of the week. You also may want to swim, bike, or do other activities. · Avoid or limit alcohol. Talk to your doctor about whether you can drink any alcohol. · Try to limit how much sodium you eat to less than 2,300 milligrams (mg) a day. Your doctor may ask you to try to eat less than 1,500 mg a day. · Eat plenty of fruits (such as bananas and oranges), vegetables, legumes, whole grains, and low-fat dairy products. · Lower the amount of saturated fat in your diet. Saturated fat is found in animal products such as milk, cheese, and meat. Limiting these foods may help you lose weight and also lower your risk for heart disease. · Do not smoke. Smoking increases your risk for heart attack and stroke. If you need help quitting, talk to your doctor about stop-smoking programs and medicines. These can increase your chances of quitting for good. When should you call for help? Call 911 anytime you think you may need emergency care. This may mean having symptoms that suggest that your blood pressure is causing a serious heart or blood vessel problem. Your blood pressure may be over 180/120.   For example, call 911 if:    · You have symptoms of a heart attack. These may include:  ? Chest pain or pressure, or a strange feeling in the chest.  ? Sweating. ? Shortness of breath. ? Nausea or vomiting. ? Pain, pressure, or a strange feeling in the back, neck, jaw, or upper belly or in one or both shoulders or arms. ? Lightheadedness or sudden weakness.   ? A fast or irregular heartbeat.     · You have symptoms of a stroke. These may include:  ? Sudden numbness, tingling, weakness, or loss of movement in your face, arm, or leg, especially on only one side of your body. ? Sudden vision changes. ? Sudden trouble speaking. ? Sudden confusion or trouble understanding simple statements. ? Sudden problems with walking or balance. ? A sudden, severe headache that is different from past headaches.     · You have severe back or belly pain.    Do not wait until your blood pressure comes down on its own. Get help right away.   Call your doctor now or seek immediate care if:    · Your blood pressure is much higher than normal (such as 180/120 or higher), but you don't have symptoms.     · You think high blood pressure is causing symptoms, such as:  ? Severe headache.  ? Blurry vision.    Watch closely for changes in your health, and be sure to contact your doctor if:    · Your blood pressure measures higher than your doctor recommends at least 2 times. That means the top number is higher or the bottom number is higher, or both.     · You think you may be having side effects from your blood pressure medicine. Where can you learn more? Go to https://Able Devicepepiceweb.Nanosphere. org and sign in to your [employer] account. Enter X567 in the Hurricane Party box to learn more about \"High Blood Pressure: Care Instructions. \"     If you do not have an account, please click on the \"Sign Up Now\" link. Current as of: July 22, 2018  Content Version: 12.0  © 9433-2838 Healthwise, Incorporated. Care instructions adapted under license by [facility] ([facility]). If you have questions about a medical condition or this instruction, always ask your healthcare professional. [place] [address] any warranty or liability for your use of this information. Patient Education        Learning About Sleeping Well  What does sleeping well mean? Sleeping well means getting enough sleep.  How soothing music, or a \"white noise\" machine. Your evening and bedtime routine  · Create a relaxing bedtime routine. You might want to take a warm shower or bath, listen to soothing music, or drink a cup of noncaffeinated tea. · Go to bed at the same time every night. And get up at the same time every morning, even if you feel tired. What to avoid  · Limit caffeine (coffee, tea, caffeinated sodas) during the day, and don't have any for at least 4 to 6 hours before bedtime. · Don't drink alcohol before bedtime. Alcohol can cause you to wake up more often during the night. · Don't smoke or use tobacco, especially in the evening. Nicotine can keep you awake. · Don't take naps during the day, especially close to bedtime. · Don't lie in bed awake for too long. If you can't fall asleep, or if you wake up in the middle of the night and can't get back to sleep within 15 minutes or so, get out of bed and go to another room until you feel sleepy. · Don't take medicine right before bed that may keep you awake or make you feel hyper or energized. Your doctor can tell you if your medicine may do this and if you can take it earlier in the day. If you can't sleep  · Imagine yourself in a peaceful, pleasant scene. Focus on the details and feelings of being in a place that is relaxing. · Get up and do a quiet or boring activity until you feel sleepy. · Don't drink any liquids after 6 p.m. if you wake up often because you have to go to the bathroom. Where can you learn more? Go to https://[employer].OneName. org and sign in to your [employer] account. Enter B505 in the [employer] box to learn more about \"Learning About Sleeping Well. \"     If you do not have an account, please click on the \"Sign Up Now\" link. Current as of: September 11, 2018  Content Version: 12.0  © 7022-3187 Healthwise, Incorporated. Care instructions adapted under license by [facility] ([facility]).  If you have questions about a medical

## 2019-05-31 DIAGNOSIS — I10 ESSENTIAL HYPERTENSION: ICD-10-CM

## 2019-05-31 DIAGNOSIS — R73.01 IFG (IMPAIRED FASTING GLUCOSE): ICD-10-CM

## 2019-05-31 DIAGNOSIS — E78.5 DYSLIPIDEMIA: ICD-10-CM

## 2019-05-31 DIAGNOSIS — R53.83 FATIGUE, UNSPECIFIED TYPE: ICD-10-CM

## 2019-07-03 ENCOUNTER — OFFICE VISIT (OUTPATIENT)
Dept: SLEEP MEDICINE | Age: 64
End: 2019-07-03
Payer: COMMERCIAL

## 2019-07-03 VITALS
HEIGHT: 59 IN | OXYGEN SATURATION: 98 % | RESPIRATION RATE: 16 BRPM | SYSTOLIC BLOOD PRESSURE: 140 MMHG | DIASTOLIC BLOOD PRESSURE: 80 MMHG | HEART RATE: 81 BPM | BODY MASS INDEX: 28.43 KG/M2 | WEIGHT: 141 LBS

## 2019-07-03 DIAGNOSIS — F51.04 PSYCHOPHYSIOLOGICAL INSOMNIA: ICD-10-CM

## 2019-07-03 DIAGNOSIS — G47.8 SLEEP RELATED EATING DISORDER: Primary | ICD-10-CM

## 2019-07-03 PROCEDURE — 99205 OFFICE O/P NEW HI 60 MIN: CPT | Performed by: INTERNAL MEDICINE

## 2019-07-03 RX ORDER — LANOLIN ALCOHOL/MO/W.PET/CERES
3 CREAM (GRAM) TOPICAL NIGHTLY
COMMUNITY
End: 2020-02-05

## 2019-07-03 RX ORDER — ZOLPIDEM TARTRATE 5 MG/1
2.5 TABLET ORAL NIGHTLY
COMMUNITY
End: 2019-07-10 | Stop reason: ALTCHOICE

## 2019-07-03 RX ORDER — FLUTICASONE PROPIONATE 50 MCG
SPRAY, SUSPENSION (ML) NASAL
COMMUNITY
Start: 2019-06-11 | End: 2019-08-08 | Stop reason: SDUPTHER

## 2019-07-03 ASSESSMENT — ENCOUNTER SYMPTOMS
TROUBLE SWALLOWING: 0
SHORTNESS OF BREATH: 0

## 2019-07-03 NOTE — PATIENT INSTRUCTIONS
Cognitive Behavioral Therapy for Insomnia Resources    Book: [facility] Insomnia by Dr. Aline Jorge ($12 on [place])    Website: cbtforinsomnia. com (Dr. Jay Wu website -- he has some resources but they require extra payment)    Online modules: Naida Snyderty --- you may research and find others as well    Sleep Hygiene Guidelines  Good dental hygiene is important in determining the health of your teeth and gums. Similarly, good sleep hygiene is important in determining the quality and quantity of your sleep. Review the below guidelines and check the ones you think you might be breaking. 1. Screen time: Turn off TV, computers, tablets, and smart phones 1 hour bBefore Bedtime  · The short waves of blue light (emitted from the screens of TVs, laptops, iPads, smart phones, etc.) mimic daylight. Thinking its daytime, your brain suppresses melatonin and becomes more alert because we have evolved to see this type of light only during the day. Whats more, the overall stimulation we get from these devices serves to keep us more alert. If TV is your relaxing activity, try to move it up a bit earlier in the evening. The TV should be in a separate room - NOT your bedroom. 2. Caffeine: Avoid Caffeine 6-8 Hours Before Bedtime   Caffeine disturbs sleep, even in people who dont think they experience a stimulation effect.  Individuals with insomnia are often more sensitive to mild stimulants than are normal sleepers.  Caffeine is found in items such as coffee, tea, soda, chocolate, and many over-the-counter medications (e.g., Excedrin).  Caffeine should be avoided in the afternoon and evening, preferably taken no later than 1pm. You might consider a trial period of no caffeine at all. 3. Nicotine: Avoid Nicotine Before Bedtime   Although some smokers claim that smoking helps them relax, nicotine is a stimulant.    The initial relaxing effects occur with the initial entry of the nicotine, but as the

## 2019-07-03 NOTE — PROGRESS NOTES
HPI Gisele Boas is a 71 y.o. overweight woman with asthma, HTN, GERD, anxiety, and longstanding insomnia on Ambien who presents as a new patient to Sleep Clinic, referred by Dr. Jihan Gordillo and Dr. Mendel Rordiguez, for difficulty falling and staying asleep and morning headaches. She reports that her previous sleep issues started in 2015 s/p hiatal hernia repair with complications requiring further surgical procedures. Now, her mind \"just doesn't stop\" at night. She has trialed zzzquil otc, but reports it \"puts me in a fog. 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 At that time, she was also going to school 3 nights per week. Sleep was very difficult - \"my mind didn't stop. \" Ambien was prescribed by her PCP at that time. She retired in 2014 from the [employer], earlier than she initially planned. In 2015, she developed severe reflux secondary to a hiatal hernia and underwent a Nissen fundoplication. This failed due to immediate post-op vomiting. She has had multiple complications and several extensive proceudres since then. She has stayed on the Ambien throughout that time given the stress of everything she was dealing with. She feels very reliant on Ambien to get good sleep, although she does report Sleep Related Eating Disorder (SRED) secondary to Ambien. She frequently woke up and found empty candy wrappers in her bed. This occurred when she was taking the 10 mg dose. However, since March, she has been tapering her dose as her PCP would like her to stop Ambien. At her visit in March, she was prescribed her last supply of 10 mg tablets and began cutting them in half to make the supply last longer. She hasn't noticed any SRED when on 5 mg dose. She saw Dr. Pineda again last month; he prescribed 5 mg tablets, and suggested taking them every other night.  Instead, she has cut the 5 mg tablets in half and is taking 2.5 mg nightly. She knows she would not sleep if she did not take the Ambien at all (as has happened in the past, if she occasionally missed a dose). shes uses a sound machine/ diffuser. Is trying to follow sleep hygiene. Her current sleep schedule: gets in bed at 10pm, takes Ambien between 10:30-11pm, and falls asleep within an hour of taking Ambien - regardless of the dose. However, now she is not sleeping as long; previously achieved 6-7 hours of sleep on 10 mg dose. She is waking up at 3am most nights and 4-5 nights out of 7 does not fall back asleep. Instead, she turns the TV back on and times it to turn off again after an hour. She is frustrated in the middle of the night, but not necessarily worried. \"I've survived [on less sleep] - and it's not debilitating. \" Physically she hasn't cut back on her lifestyle and doesn't necessarily feel tired during the day, even with less sleep. She doesn't nap and has never been one to nap. She has tried OTC Nyquil in the past, but it gave her a strong hangover effect. She recently starting taking 2 melatonin gummies (\"I know they probably do nothing\") in addition to the Ambien. She recognizes that doctors are being encouraged to get their patients off controlled medications. She also knows that her sleep is very disrupted by her , who is a heavy mouthbreather and likely has undiagnosed KATERINA. He no longer sleeps in the same bed (sleeps on the sofa). However, she is not yet ready to stop Ambien altogether.       STOP-BANG score of 4/8 for  (+)snoring, (+)daytime fatigue, (-)observed apneas, (+)high blood pressure, (-)BMI>35, (+)age >50, (-)neck circumference >15in, (-)female gender    Sleep History:  Time in bed: 10pm  Bed activities: sets TV to a timer  Lights out time: 11pm if not sooner  Time to fall asleep: within hour of taking Ambien  Nighttime awakenings: usually at 3am to take the dogs out, nightly.  melatonin 3 MG TABS tablet Take 3 mg by mouth nightly      atenolol (TENORMIN) 50 MG tablet Take 1 tablet by mouth daily 90 tablet 1    albuterol sulfate HFA (VENTOLIN HFA) 108 (90 BASE) MCG/ACT inhaler Inhale 2 puffs into the lungs every 6 hours as needed for Wheezing 3 Inhaler 1    beclomethasone (QVAR) 80 MCG/ACT inhaler Inhale 1 puff into the lungs 2 times daily 3 Inhaler 1     No current facility-administered medications for this visit. Review of Systems  Review of Systems   Constitutional: Positive for fatigue. Negative for unexpected weight change. HENT: Negative for postnasal drip and trouble swallowing. No dentures   Respiratory: Negative for shortness of breath. Cardiovascular: Negative for palpitations and leg swelling. Gastrointestinal:        No significant GERD   Genitourinary:        No nocturia   Musculoskeletal:        No significant musculoskeletal pain affecting sleep   Neurological: Negative for headaches. Psychiatric/Behavioral: Positive for sleep disturbance. Negative for dysphoric mood. The patient is not nervous/anxious. Objective:   Physical Exam  BP (!) 140/80   Pulse 81   Resp 16   Ht 4' 11\" (1.499 m)   Wt 141 lb (64 kg)   LMP  (LMP Unknown)   SpO2 98%   BMI 28.48 kg/m²      Additional Measurements    07/03/19 1022   Neck circumference: 15       Physical Exam   Constitutional: She is oriented to person, place, and time.  female, alert and interactive   HENT:   Nose: No nasal deformity. Mouth/Throat: Oropharynx is clear and moist. She does not have dentures. Normal dentition. No oropharyngeal exudate. Mallampati II, tonsils partially visible   Eyes: Pupils are equal, round, and reactive to light. Conjunctivae and EOM are normal.   Neck: Normal range of motion. Neck supple. Cardiovascular: Normal rate, regular rhythm and normal heart sounds.    Pulmonary/Chest: Effort normal and breath sounds normal.   Musculoskeletal:

## 2019-07-10 ENCOUNTER — HOSPITAL ENCOUNTER (OUTPATIENT)
Age: 64
Discharge: HOME | End: 2019-07-12
Payer: COMMERCIAL

## 2019-07-10 ENCOUNTER — OFFICE VISIT (OUTPATIENT)
Dept: FAMILY MEDICINE CLINIC | Age: 64
End: 2019-07-10
Payer: COMMERCIAL

## 2019-07-10 VITALS
SYSTOLIC BLOOD PRESSURE: 112 MMHG | HEIGHT: 59 IN | DIASTOLIC BLOOD PRESSURE: 68 MMHG | BODY MASS INDEX: 28.22 KG/M2 | WEIGHT: 140 LBS | RESPIRATION RATE: 18 BRPM | OXYGEN SATURATION: 98 % | TEMPERATURE: 98.7 F | HEART RATE: 71 BPM

## 2019-07-10 DIAGNOSIS — F41.9 ANXIETY: ICD-10-CM

## 2019-07-10 DIAGNOSIS — K21.9 GASTROESOPHAGEAL REFLUX DISEASE WITHOUT ESOPHAGITIS: ICD-10-CM

## 2019-07-10 DIAGNOSIS — M17.11 PRIMARY OSTEOARTHRITIS OF RIGHT KNEE: ICD-10-CM

## 2019-07-10 DIAGNOSIS — I10 ESSENTIAL HYPERTENSION: Primary | ICD-10-CM

## 2019-07-10 DIAGNOSIS — J45.20 MILD INTERMITTENT ASTHMA WITHOUT COMPLICATION: ICD-10-CM

## 2019-07-10 DIAGNOSIS — J01.90 ACUTE SINUSITIS, RECURRENCE NOT SPECIFIED, UNSPECIFIED LOCATION: ICD-10-CM

## 2019-07-10 PROCEDURE — 99213 OFFICE O/P EST LOW 20 MIN: CPT | Performed by: FAMILY MEDICINE

## 2019-07-10 PROCEDURE — 80307 DRUG TEST PRSMV CHEM ANLYZR: CPT

## 2019-07-10 RX ORDER — BUSPIRONE HYDROCHLORIDE 5 MG/1
5 TABLET ORAL 3 TIMES DAILY
Qty: 90 TABLET | Refills: 5 | Status: SHIPPED
Start: 2019-07-10 | End: 2020-04-17

## 2019-07-10 RX ORDER — METHYLPREDNISOLONE 4 MG/1
TABLET ORAL
Qty: 1 KIT | Refills: 0 | Status: SHIPPED | OUTPATIENT
Start: 2019-07-10 | End: 2019-07-17 | Stop reason: ALTCHOICE

## 2019-07-10 ASSESSMENT — ENCOUNTER SYMPTOMS
FACIAL SWELLING: 0
CONSTIPATION: 0
ANAL BLEEDING: 0
EYE DISCHARGE: 0
ORTHOPNEA: 0
RESPIRATORY NEGATIVE: 1
RECTAL PAIN: 0
SHORTNESS OF BREATH: 0
SORE THROAT: 0
EYE REDNESS: 0
STRIDOR: 0
VOICE CHANGE: 0
SINUS PAIN: 0
TROUBLE SWALLOWING: 0
APNEA: 0
PHOTOPHOBIA: 0
CHEST TIGHTNESS: 0
BACK PAIN: 1
COLOR CHANGE: 0
BLOOD IN STOOL: 0
RHINORRHEA: 0
CHOKING: 0
EYE ITCHING: 0
ABDOMINAL DISTENTION: 0
SINUS PRESSURE: 0
WHEEZING: 0
NAUSEA: 0
ALLERGIC/IMMUNOLOGIC NEGATIVE: 1
BLURRED VISION: 0
EYE PAIN: 0

## 2019-07-10 ASSESSMENT — PATIENT HEALTH QUESTIONNAIRE - PHQ9
SUM OF ALL RESPONSES TO PHQ QUESTIONS 1-9: 2
2. FEELING DOWN, DEPRESSED OR HOPELESS: 1
SUM OF ALL RESPONSES TO PHQ QUESTIONS 1-9: 2
SUM OF ALL RESPONSES TO PHQ9 QUESTIONS 1 & 2: 2
1. LITTLE INTEREST OR PLEASURE IN DOING THINGS: 1

## 2019-07-10 NOTE — PROGRESS NOTES
Marques Duran is a 71 y.o. female. HPI/Chief C/O:  Chief Complaint   Patient presents with    Insomnia     Pt here to follow up from sleep medicine    Diarrhea     On July 4th/5th she experienced multiple episodes of diarrhea, took Immodium, but continued to have diarrhea, has subsided in the last few days, but still doesn't feel right     Allergies   Allergen Reactions    Molds & Smuts Swelling    Meperidine Nausea And Vomiting   The patient is here for a medication list and treatment planning review  We will go over our care planning goals as well as take care of all refills  She is post sleep consult and here to talk findings  C/O some sinus and diarrhea    Hypertension   This is a chronic problem. The current episode started more than 1 year ago. The problem is controlled. Associated symptoms include anxiety. Pertinent negatives include no blurred vision, chest pain, headaches, malaise/fatigue, neck pain, orthopnea, palpitations, peripheral edema, PND, shortness of breath or sweats. Risk factors for coronary artery disease include post-menopausal state and family history. Past treatments include lifestyle changes and beta blockers. The current treatment provides significant improvement. Compliance problems include exercise and diet. There is no history of angina, kidney disease, CAD/MI, CVA, heart failure, left ventricular hypertrophy, PVD or retinopathy. There is no history of chronic renal disease, coarctation of the aorta, hyperaldosteronism, hypercortisolism, hyperparathyroidism, a hypertension causing med, pheochromocytoma, renovascular disease, sleep apnea or a thyroid problem. ROS:  Review of Systems   Constitutional: Negative. Negative for activity change, appetite change, diaphoresis, fatigue, malaise/fatigue and unexpected weight change. HENT: Positive for congestion and postnasal drip.  Negative for dental problem, drooling, ear discharge, ear pain, facial swelling,

## 2019-07-10 NOTE — PATIENT INSTRUCTIONS
partner. · Keep your bedroom quiet, dark, and cool. Use curtains, blinds, or a sleep mask to block out light. To block out noise, use earplugs, soothing music, or a \"white noise\" machine. Your evening and bedtime routine  · Create a relaxing bedtime routine. You might want to take a warm shower or bath, listen to soothing music, or drink a cup of noncaffeinated tea. · Go to bed at the same time every night. And get up at the same time every morning, even if you feel tired. What to avoid  · Limit caffeine (coffee, tea, caffeinated sodas) during the day, and don't have any for at least 4 to 6 hours before bedtime. · Don't drink alcohol before bedtime. Alcohol can cause you to wake up more often during the night. · Don't smoke or use tobacco, especially in the evening. Nicotine can keep you awake. · Don't take naps during the day, especially close to bedtime. · Don't lie in bed awake for too long. If you can't fall asleep, or if you wake up in the middle of the night and can't get back to sleep within 15 minutes or so, get out of bed and go to another room until you feel sleepy. · Don't take medicine right before bed that may keep you awake or make you feel hyper or energized. Your doctor can tell you if your medicine may do this and if you can take it earlier in the day. If you can't sleep  · Imagine yourself in a peaceful, pleasant scene. Focus on the details and feelings of being in a place that is relaxing. · Get up and do a quiet or boring activity until you feel sleepy. · Don't drink any liquids after 6 p.m. if you wake up often because you have to go to the bathroom. Where can you learn more? Go to https://Ad Infusepo.PercSys. org and sign in to your [employer] account. Enter M882 in the [employer] box to learn more about \"Learning About Sleeping Well. \"     If you do not have an account, please click on the \"Sign Up Now\" link.   Current as of: September 11, 2018  Content Version: 12.0  © 3153-8299 Healthwise, Incorporated. Care instructions adapted under license by [facility] ([facility]). If you have questions about a medical condition or this instruction, always ask your healthcare professional. [address] any warranty or liability for your use of this information. Patient Education        Insomnia: Care Instructions  Your Care Instructions    Insomnia is the inability to sleep well. It is a common problem for most people at some time. Insomnia may make it hard for you to get to sleep, stay asleep, or sleep as long as you need to. This can make you tired and grouchy during the day. It can also make you forgetful, less effective at work, and unhappy. Insomnia can be caused by conditions such as depression or anxiety. Pain can also affect your ability to sleep. When these problems are solved, the insomnia usually clears up. But sometimes bad sleep habits can cause insomnia. If insomnia is affecting your work or your enjoyment of life, you can take steps to improve your sleep. Follow-up care is a key part of your treatment and safety. Be sure to make and go to all appointments, and call your doctor if you are having problems. It's also a good idea to know your test results and keep a list of the medicines you take. How can you care for yourself at home? What to avoid  · Do not have drinks with caffeine, such as coffee or black tea, for 8 hours before bed. · Do not smoke or use other types of tobacco near bedtime. Nicotine is a stimulant and can keep you awake. · Avoid drinking alcohol late in the evening, because it can cause you to wake in the middle of the night. · Do not eat a big meal close to bedtime. If you are hungry, eat a light snack. · Do not drink a lot of water close to bedtime, because the need to urinate may wake you up during the night. · Do not read or watch TV in bed. Use the bed only for sleeping and sexual activity.   What to try  · Go to bed

## 2019-07-12 PROBLEM — T78.40XA ALLERGY: Status: RESOLVED | Noted: 2019-04-16 | Resolved: 2019-07-12

## 2019-07-12 PROBLEM — R16.0 LIVER MASS: Status: ACTIVE | Noted: 2019-07-12

## 2019-07-15 DIAGNOSIS — F51.04 PSYCHOPHYSIOLOGICAL INSOMNIA: Primary | ICD-10-CM

## 2019-07-15 NOTE — TELEPHONE ENCOUNTER
Called and spoke with Ms. Sutherland regarding efforts to wean off Ambien. Currently at 2.5 mg dose, but ran out of tablets 2 nights ago. Has not yet looked into CBT-I resources suggested at our visit. Is still willing to come off Ambien but wants to improve sleep hygiene habits and learn more about CBT-I first. PCP will not prescribe more Ambien. I agreed to see her in clinic one more time (she will have to be added onto schedule in next 3-5 weeks). She will be contacted separately to schedule. Once date of appointment is known, script for Ambien 2.5 mg will be written (enough tablets to last until appt). Patient will pick script up from [facility] on 7/17. She is aware that this is the only Ambien script I will provide, as she is on the lowest dose and can successfully taper off. She needs to look into CBT-I resources ASAP as these will support her efforts to successfully stop Ambien.     Bakari Doll MD

## 2019-07-17 RX ORDER — ZOLPIDEM TARTRATE 5 MG/1
2.5 TABLET ORAL NIGHTLY
Qty: 15 TABLET | Refills: 0 | Status: SHIPPED | OUTPATIENT
Start: 2019-07-17 | End: 2019-07-17 | Stop reason: ALTCHOICE

## 2019-07-19 ENCOUNTER — HOSPITAL ENCOUNTER (OUTPATIENT)
Age: 64
Setting detail: SPECIMEN
Discharge: HOME | End: 2019-07-19
Payer: COMMERCIAL

## 2019-07-19 PROCEDURE — 87329 GIARDIA AG IA: CPT

## 2019-07-19 PROCEDURE — 87045 FECES CULTURE AEROBIC BACT: CPT

## 2019-07-19 PROCEDURE — 89055 LEUKOCYTE ASSESSMENT FECAL: CPT

## 2019-07-19 PROCEDURE — 87077 CULTURE AEROBIC IDENTIFY: CPT

## 2019-07-21 LAB — WHITE BLOOD CELLS (WBC), STOOL: NORMAL

## 2019-07-22 LAB — GIARDIA ANTIGEN STOOL: NORMAL

## 2019-07-23 LAB
CULTURE, STOOL: ABNORMAL
CULTURE, STOOL: ABNORMAL
ORGANISM: ABNORMAL

## 2019-08-08 RX ORDER — FLUTICASONE PROPIONATE 50 MCG
SPRAY, SUSPENSION (ML) NASAL
Qty: 16 G | Refills: 3 | Status: SHIPPED
Start: 2019-08-08 | End: 2020-02-07

## 2020-01-06 RX ORDER — ATENOLOL 50 MG/1
TABLET ORAL
Qty: 30 TABLET | Refills: 0 | Status: SHIPPED
Start: 2020-01-06 | End: 2020-02-05 | Stop reason: SDUPTHER

## 2020-02-05 ENCOUNTER — OFFICE VISIT (OUTPATIENT)
Dept: FAMILY MEDICINE CLINIC | Age: 65
End: 2020-02-05
Payer: COMMERCIAL

## 2020-02-05 VITALS
WEIGHT: 142 LBS | HEIGHT: 59 IN | SYSTOLIC BLOOD PRESSURE: 118 MMHG | RESPIRATION RATE: 18 BRPM | HEART RATE: 67 BPM | BODY MASS INDEX: 28.63 KG/M2 | OXYGEN SATURATION: 98 % | TEMPERATURE: 98.4 F | DIASTOLIC BLOOD PRESSURE: 80 MMHG

## 2020-02-05 PROCEDURE — 96372 THER/PROPH/DIAG INJ SC/IM: CPT | Performed by: FAMILY MEDICINE

## 2020-02-05 PROCEDURE — 99214 OFFICE O/P EST MOD 30 MIN: CPT | Performed by: FAMILY MEDICINE

## 2020-02-05 RX ORDER — ALBUTEROL SULFATE 90 UG/1
2 AEROSOL, METERED RESPIRATORY (INHALATION) EVERY 6 HOURS PRN
Qty: 3 INHALER | Refills: 1 | Status: SHIPPED
Start: 2020-02-05 | End: 2022-01-13 | Stop reason: SDUPTHER

## 2020-02-05 RX ORDER — METHYLPREDNISOLONE 4 MG/1
TABLET ORAL
Qty: 1 KIT | Refills: 0 | Status: SHIPPED | OUTPATIENT
Start: 2020-02-05 | End: 2020-02-11

## 2020-02-05 RX ORDER — ATENOLOL 50 MG/1
TABLET ORAL
Qty: 90 TABLET | Refills: 1 | Status: SHIPPED
Start: 2020-02-05 | End: 2020-04-17 | Stop reason: SDUPTHER

## 2020-02-05 RX ORDER — FLUTICASONE PROPIONATE 50 MCG
1 SPRAY, SUSPENSION (ML) NASAL DAILY
Qty: 1 BOTTLE | Refills: 3 | Status: SHIPPED | OUTPATIENT
Start: 2020-02-05 | End: 2020-06-12 | Stop reason: SDUPTHER

## 2020-02-05 RX ORDER — DEXAMETHASONE SODIUM PHOSPHATE 4 MG/ML
4 INJECTION, SOLUTION INTRA-ARTICULAR; INTRALESIONAL; INTRAMUSCULAR; INTRAVENOUS; SOFT TISSUE ONCE
Status: COMPLETED | OUTPATIENT
Start: 2020-02-05 | End: 2020-02-05

## 2020-02-05 RX ORDER — GUAIFENESIN 600 MG/1
600 TABLET, EXTENDED RELEASE ORAL 2 TIMES DAILY
Qty: 30 TABLET | Refills: 0 | Status: SHIPPED | OUTPATIENT
Start: 2020-02-05 | End: 2020-02-20

## 2020-02-05 RX ADMIN — DEXAMETHASONE SODIUM PHOSPHATE 4 MG: 4 INJECTION, SOLUTION INTRA-ARTICULAR; INTRALESIONAL; INTRAMUSCULAR; INTRAVENOUS; SOFT TISSUE at 16:12

## 2020-02-05 ASSESSMENT — ENCOUNTER SYMPTOMS
WHEEZING: 0
RESPIRATORY NEGATIVE: 1
DIARRHEA: 0
VOICE CHANGE: 0
BACK PAIN: 1
COLOR CHANGE: 0
STRIDOR: 0
CHEST TIGHTNESS: 0
RHINORRHEA: 0
FACIAL SWELLING: 0
CONSTIPATION: 0
PHOTOPHOBIA: 0
SINUS PAIN: 0
CHOKING: 0
TROUBLE SWALLOWING: 0
ABDOMINAL PAIN: 0
EYE PAIN: 0
SORE THROAT: 0
ANAL BLEEDING: 0
ABDOMINAL DISTENTION: 0
APNEA: 0
NAUSEA: 0
BLOOD IN STOOL: 0
EYE REDNESS: 0
ALLERGIC/IMMUNOLOGIC NEGATIVE: 1
EYE ITCHING: 0
SHORTNESS OF BREATH: 0
COUGH: 0
RECTAL PAIN: 0
EYE DISCHARGE: 0
SINUS PRESSURE: 0

## 2020-02-05 ASSESSMENT — PATIENT HEALTH QUESTIONNAIRE - PHQ9
SUM OF ALL RESPONSES TO PHQ QUESTIONS 1-9: 0
2. FEELING DOWN, DEPRESSED OR HOPELESS: 0
SUM OF ALL RESPONSES TO PHQ9 QUESTIONS 1 & 2: 0
1. LITTLE INTEREST OR PLEASURE IN DOING THINGS: 0
SUM OF ALL RESPONSES TO PHQ QUESTIONS 1-9: 0

## 2020-02-05 NOTE — PROGRESS NOTES
Sabine Caro is a 71 y.o. female. HPI/Chief C/O:  Chief Complaint   Patient presents with    Hypertension     Pt here for check up and medication refills    Insomnia     Pt states that she has not had a good nights sleep since she had to stop taking Ambien - states that she has been taking different OTC products to try and get sleep but none have really helped    Medication Refill     Pharmacy reviewed - meds pended    Health Maintenance     Reviewed with pt     Allergies   Allergen Reactions    Molds & Smuts Swelling    Meperidine Nausea And Vomiting   The patient is here for a medication list and treatment planning review  We will go over our care planning goals as well as take care of all refills  We will set up labs as well     Hypertension   This is a chronic problem. The current episode started more than 1 year ago. The problem is controlled. Associated symptoms include anxiety. Pertinent negatives include no blurred vision, chest pain, headaches, malaise/fatigue, neck pain, orthopnea, palpitations, peripheral edema, PND, shortness of breath or sweats. Risk factors for coronary artery disease include post-menopausal state and family history. Past treatments include lifestyle changes and beta blockers. The current treatment provides significant improvement. Compliance problems include exercise and diet. There is no history of angina, kidney disease, CAD/MI, CVA, heart failure, left ventricular hypertrophy, PVD or retinopathy. There is no history of chronic renal disease, coarctation of the aorta, hyperaldosteronism, hypercortisolism, hyperparathyroidism, a hypertension causing med, pheochromocytoma, renovascular disease, sleep apnea or a thyroid problem. ROS:  Review of Systems   Constitutional: Positive for fatigue. Negative for activity change, appetite change, chills, diaphoresis, fever, malaise/fatigue and unexpected weight change.    HENT: Positive for congestion and postnasal drip. Negative for dental problem, drooling, ear discharge, ear pain, facial swelling, hearing loss, mouth sores, nosebleeds, rhinorrhea, sinus pressure, sinus pain, sneezing, sore throat, tinnitus, trouble swallowing and voice change. Eyes: Negative for blurred vision, photophobia, pain, discharge, redness, itching and visual disturbance. Respiratory: Negative. Negative for apnea, cough, choking, chest tightness, shortness of breath, wheezing and stridor. Cardiovascular: Negative. Negative for chest pain, palpitations, orthopnea, leg swelling and PND. Gastrointestinal: Negative for abdominal distention, abdominal pain, anal bleeding, blood in stool, constipation, diarrhea, nausea and rectal pain. Endocrine: Negative. Negative for cold intolerance, heat intolerance, polydipsia, polyphagia and polyuria. Genitourinary: Negative. Negative for decreased urine volume, difficulty urinating, dyspareunia, dysuria, enuresis, flank pain, frequency, genital sores, hematuria and urgency. Musculoskeletal: Positive for arthralgias, back pain and myalgias. Negative for gait problem, joint swelling, neck pain and neck stiffness. Skin: Negative. Negative for color change, pallor, rash and wound. Allergic/Immunologic: Negative. Negative for environmental allergies, food allergies and immunocompromised state. Neurological: Negative. Negative for dizziness, tremors, seizures, syncope, facial asymmetry, speech difficulty, weakness, light-headedness, numbness and headaches. Hematological: Negative. Negative for adenopathy. Does not bruise/bleed easily. Psychiatric/Behavioral: Positive for sleep disturbance. Negative for agitation, behavioral problems, confusion, decreased concentration, dysphoric mood, hallucinations, self-injury and suicidal ideas. The patient is nervous/anxious. The patient is not hyperactive.          Past Medical/Surgical Hx;  Reviewed with patient      Diagnosis Date    supple. No neck rigidity or muscular tenderness. Thyroid: No thyromegaly. Vascular: No carotid bruit or JVD. Trachea: No tracheal deviation. Cardiovascular:      Rate and Rhythm: Normal rate and regular rhythm. Pulses: Normal pulses. Heart sounds: Normal heart sounds. No murmur. No friction rub. No gallop. Pulmonary:      Effort: Pulmonary effort is normal. No respiratory distress. Breath sounds: Normal breath sounds. No stridor. No wheezing, rhonchi or rales. Chest:      Chest wall: No tenderness. Abdominal:      General: Bowel sounds are normal. There is no distension or abdominal bruit. Palpations: Abdomen is soft. Abdomen is not rigid. There is no shifting dullness, fluid wave, hepatomegaly, splenomegaly, mass or pulsatile mass. Tenderness: There is no abdominal tenderness. There is no right CVA tenderness, left CVA tenderness, guarding or rebound. Negative signs include Nguyen's sign and McBurney's sign. Hernia: No hernia is present. There is no hernia in the ventral area, right inguinal area or left inguinal area. Genitourinary:     Vagina: Normal.   Musculoskeletal:         General: No swelling, tenderness, deformity or signs of injury. Right knee: She exhibits normal range of motion, no swelling, no effusion, no ecchymosis, no deformity, no laceration, no erythema, normal alignment, no LCL laxity, normal patellar mobility, no bony tenderness, normal meniscus and no MCL laxity. No tenderness found. No medial joint line, no lateral joint line, no MCL, no LCL and no patellar tendon tenderness noted. Right lower leg: No edema. Left lower leg: No edema. Lymphadenopathy:      Cervical: No cervical adenopathy. Skin:     General: Skin is warm. Coloration: Skin is not jaundiced or pale. Findings: No bruising, erythema, lesion or rash. Neurological:      General: No focal deficit present.       Mental Status: She is alert and oriented to person, place, and time. Cranial Nerves: No cranial nerve deficit. Sensory: No sensory deficit. Motor: No weakness or abnormal muscle tone. Coordination: Coordination normal.      Gait: Gait normal.      Deep Tendon Reflexes: Reflexes are normal and symmetric. Reflexes normal.         ASSESSMENT/PLAN  Eunice was seen today for hypertension, insomnia, medication refill and health maintenance. Diagnoses and all orders for this visit:    Essential hypertension ---controlled   --patient is instructed on low to moderate sodium ( 2 to 2.5 grams ), daily    Also to increase potassium in the diet to about 3.5 grams daily    Literature is provided   ---CARDIAC---asa, ace, BETA, statin, hctz, ( ccb )  -     atenolol (TENORMIN) 50 MG tablet; TAKE ONE TABLET BY MOUTH EVERY DAY  -     Comprehensive Metabolic Panel; Future  -     CBC Auto Differential; Future  -     Uric Acid; Future    Cough  -     albuterol sulfate HFA (VENTOLIN HFA) 108 (90 Base) MCG/ACT inhaler; Inhale 2 puffs into the lungs every 6 hours as needed for Wheezing  -     beclomethasone (QVAR) 80 MCG/ACT inhaler; Inhale 1 puff into the lungs 2 times daily  -     Comprehensive Metabolic Panel; Future  -     CBC Auto Differential; Future    Sinusitis, unspecified chronicity, unspecified location  -     dexamethasone (DECADRON) injection 4 mg  -     methylPREDNISolone (MEDROL DOSEPACK) 4 MG tablet; Take as directed  -     fluticasone (FLONASE) 50 MCG/ACT nasal spray; 1 spray by Nasal route daily  -     guaiFENesin (MUCINEX) 600 MG extended release tablet; Take 1 tablet by mouth 2 times daily for 15 days  -     Comprehensive Metabolic Panel; Future  -     CBC Auto Differential; Future  PLAN---irrigate right and left sinus---Rx      IFG (impaired fasting glucose)  -     Comprehensive Metabolic Panel;  Future  -     CBC Auto Differential; Future  -     Hemoglobin A1C; Future    Dyslipidemia  -     Comprehensive Metabolic Panel; importance of regular Health Maintenance follow up  Health Maintenance   Topic    Hepatitis C screen     HIV screen     Breast cancer screen     Pneumococcal 65+ years Vaccine (1 of 1 - PPSV23)    Lipid screen     DTaP/Tdap/Td vaccine (2 - Td)    Colon cancer screen colonoscopy     DEXA (modify frequency per FRAX score)     Flu vaccine     Shingles Vaccine     Hepatitis A vaccine     Hepatitis B vaccine     Hib vaccine     Meningococcal (ACWY) vaccine

## 2020-02-05 NOTE — PATIENT INSTRUCTIONS
washes. Where can you learn more? Go to [email or web address] and sign in to your [employer] account. Enter [id number] [id number] [phone] in the [facility] box to learn more about \"Saline Nasal Washes: Care Instructions. \"     If you do not have an account, please click on the \"Sign Up Now\" link. Current as of: July 28, 2019  Content Version: 12.3  © 4890-4738 [employer]. Care instructions adapted under license by [facility] ([facility]). If you have questions about a medical condition or this instruction, always ask your healthcare professional. [address] any warranty or liability for your use of this information. Patient Education        Sinusitis: Care Instructions  Your Care Instructions    Sinusitis is an infection of the lining of the sinus cavities in your head. Sinusitis often follows a cold. It causes pain and pressure in your head and face. In most cases, sinusitis gets better on its own in 1 to 2 weeks. But some mild symptoms may last for several weeks. Sometimes antibiotics are needed. Follow-up care is a key part of your treatment and safety. Be sure to make and go to all appointments, and call your doctor if you are having problems. It's also a good idea to know your test results and keep a list of the medicines you take. How can you care for yourself at home? · Take an over-the-counter pain medicine, such as acetaminophen (Tylenol), ibuprofen (Advil, Motrin), or naproxen (Aleve). Read and follow all instructions on the label. · If the doctor prescribed antibiotics, take them as directed. Do not stop taking them just because you feel better. You need to take the full course of antibiotics. · Be careful when taking over-the-counter cold or flu medicines and Tylenol at the same time. Many of these medicines have acetaminophen, which is Tylenol. Read the labels to make sure that you are not taking more than the recommended dose.  Too much acetaminophen (Tylenol) can be harmful. · Breathe warm, moist air from a steamy shower, a hot bath, or a sink filled with hot water. Avoid cold, dry air. Using a humidifier in your home may help. Follow the directions for cleaning the machine. · Use saline (saltwater) nasal washes to help keep your nasal passages open and wash out mucus and bacteria. You can buy saline nose drops at a grocery store or drugstore. Or you can make your own at home by adding 1 teaspoon of salt and 1 teaspoon of baking soda to 2 cups of distilled water. If you make your own, fill a bulb syringe with the solution, insert the tip into your nostril, and squeeze gently. [place] your nose. · Put a hot, wet towel or a warm gel pack on your face 3 or 4 times a day for 5 to 10 minutes each time. · Try a decongestant nasal spray like oxymetazoline (Afrin). Do not use it for more than 3 days in a row. Using it for more than 3 days can make your congestion worse. When should you call for help? Call your doctor now or seek immediate medical care if:    · You have new or worse swelling or redness in your face or around your eyes.     · You have a new or higher fever.    Watch closely for changes in your health, and be sure to contact your doctor if:    · You have new or worse facial pain.     · The mucus from your nose becomes thicker (like pus) or has new blood in it.     · You are not getting better as expected. Where can you learn more? Go to [email or web address]://Tangoe.[email or web address] org and sign in to your [employer] account. Enter F910 in the [facility] box to learn more about \"Sinusitis: Care Instructions. \"     If you do not have an account, please click on the \"Sign Up Now\" link. Current as of: July 28, 2019  Content Version: 12.3  © 4853-7505 Healthwise, Incorporated. Care instructions adapted under license by [facility] ([facility]).  If you have questions about a medical condition or this instruction, always ask your healthcare professional. [employer] disclaims any warranty or liability for your use of this information.

## 2020-02-07 ASSESSMENT — ENCOUNTER SYMPTOMS
ORTHOPNEA: 0
BLURRED VISION: 0

## 2020-02-08 LAB
ALBUMIN SERPL-MCNC: NORMAL G/DL
ALP BLD-CCNC: NORMAL U/L
ALT SERPL-CCNC: NORMAL U/L
ANION GAP SERPL CALCULATED.3IONS-SCNC: NORMAL MMOL/L
AST SERPL-CCNC: NORMAL U/L
AVERAGE GLUCOSE: NORMAL
BASOPHILS ABSOLUTE: NORMAL
BASOPHILS RELATIVE PERCENT: NORMAL
BILIRUB SERPL-MCNC: NORMAL MG/DL
BUN BLDV-MCNC: NORMAL MG/DL
CALCIUM SERPL-MCNC: NORMAL MG/DL
CHLORIDE BLD-SCNC: NORMAL MMOL/L
CHOLESTEROL, TOTAL: 194 MG/DL
CHOLESTEROL/HDL RATIO: 2.62
CO2: NORMAL
CREAT SERPL-MCNC: NORMAL MG/DL
EOSINOPHILS ABSOLUTE: NORMAL
EOSINOPHILS RELATIVE PERCENT: NORMAL
GFR CALCULATED: NORMAL
GLUCOSE BLD-MCNC: NORMAL MG/DL
HBA1C MFR BLD: 4.9 %
HCT VFR BLD CALC: NORMAL %
HDLC SERPL-MCNC: 74 MG/DL (ref 35–70)
HEMOGLOBIN: NORMAL
LDL CHOLESTEROL CALCULATED: 102 MG/DL (ref 0–160)
LYMPHOCYTES ABSOLUTE: NORMAL
LYMPHOCYTES RELATIVE PERCENT: NORMAL
MCH RBC QN AUTO: NORMAL PG
MCHC RBC AUTO-ENTMCNC: NORMAL G/DL
MCV RBC AUTO: NORMAL FL
MONOCYTES ABSOLUTE: NORMAL
MONOCYTES RELATIVE PERCENT: NORMAL
NEUTROPHILS ABSOLUTE: NORMAL
NEUTROPHILS RELATIVE PERCENT: NORMAL
PDW BLD-RTO: NORMAL %
PLATELET # BLD: NORMAL 10*3/UL
PMV BLD AUTO: NORMAL FL
POTASSIUM SERPL-SCNC: NORMAL MMOL/L
RBC # BLD: NORMAL 10*6/UL
SODIUM BLD-SCNC: NORMAL MMOL/L
TOTAL PROTEIN: NORMAL
TRIGL SERPL-MCNC: 88 MG/DL
TSH SERPL DL<=0.05 MIU/L-ACNC: NORMAL M[IU]/L
URIC ACID: NORMAL
VLDLC SERPL CALC-MCNC: ABNORMAL MG/DL
WBC # BLD: NORMAL 10*3/UL

## 2020-03-10 ENCOUNTER — TELEPHONE (OUTPATIENT)
Dept: FAMILY MEDICINE CLINIC | Age: 65
End: 2020-03-10

## 2020-03-10 RX ORDER — RAMELTEON 8 MG/1
8 TABLET ORAL NIGHTLY PRN
Qty: 30 TABLET | Refills: 3 | Status: SHIPPED
Start: 2020-03-10 | End: 2020-06-12

## 2020-03-17 NOTE — TELEPHONE ENCOUNTER
She needs to keep taking the medication  Add Zinc 12.5 and magnesium 225 mg  I will NOT write Ambien or any benzindamine sleep medication

## 2020-03-17 NOTE — TELEPHONE ENCOUNTER
Patient notified to add Zinc and Magnesium; states she will give that a try.   Electronically signed by Azra Gordon on 3/17/2020 at 10:33 AM

## 2020-04-17 ENCOUNTER — VIRTUAL VISIT (OUTPATIENT)
Dept: FAMILY MEDICINE CLINIC | Age: 65
End: 2020-04-17
Payer: COMMERCIAL

## 2020-04-17 VITALS — TEMPERATURE: 97.4 F | HEART RATE: 68 BPM | OXYGEN SATURATION: 98 %

## 2020-04-17 PROCEDURE — 99443 PR PHYS/QHP TELEPHONE EVALUATION 21-30 MIN: CPT | Performed by: FAMILY MEDICINE

## 2020-04-17 RX ORDER — ATENOLOL 50 MG/1
TABLET ORAL
Qty: 90 TABLET | Refills: 1 | Status: SHIPPED
Start: 2020-04-17 | End: 2020-06-12 | Stop reason: SDUPTHER

## 2020-04-17 RX ORDER — PROMETHAZINE HYDROCHLORIDE 25 MG/1
25 TABLET ORAL DAILY PRN
Qty: 90 TABLET | Refills: 0 | Status: SHIPPED | OUTPATIENT
Start: 2020-04-17

## 2020-04-17 NOTE — PROGRESS NOTES
Leodan Naidu is a 71 y.o. female evaluated via telephone on 4/17/2020. Consent:  She and/or health care decision maker is aware that that she may receive a bill for this telephone service, depending on her insurance coverage, and has provided verbal consent to proceed: Yes      Documentation:  I communicated with the patient and/or health care decision maker about she calls for refills and we have a very long discussion on nausea and GERD. She still C/O no sleep. We do a complete med review and talk care plan for sleep, nausea and GERD. Details of this discussion including any medical advice provided: ---VASCULAR PANEL  A) asa, plavix, aggrenox  B) coumadin, pletal, tzd, statin  C) ace, hctz, folic, ccb  D) cannikinumab, fish oils     ---CARDIAC---asa, ace, beta, statin, hctz, ( ccb )      I affirm this is a Patient Initiated Episode with an Established Patient who has not had a related appointment within my department in the past 7 days or scheduled within the next 24 hours.     Total Time: minutes: 21-30 minutes    Note: not billable if this call serves to triage the patient into an appointment for the relevant concern      John King

## 2020-05-01 RX ORDER — TRAZODONE HYDROCHLORIDE 50 MG/1
50 TABLET ORAL NIGHTLY PRN
Qty: 30 TABLET | Refills: 5 | Status: SHIPPED
Start: 2020-05-01 | End: 2020-10-19

## 2020-06-12 ENCOUNTER — OFFICE VISIT (OUTPATIENT)
Dept: FAMILY MEDICINE CLINIC | Age: 65
End: 2020-06-12
Payer: COMMERCIAL

## 2020-06-12 VITALS
HEART RATE: 67 BPM | OXYGEN SATURATION: 95 % | RESPIRATION RATE: 18 BRPM | TEMPERATURE: 97.9 F | BODY MASS INDEX: 28.22 KG/M2 | HEIGHT: 59 IN | WEIGHT: 140 LBS | DIASTOLIC BLOOD PRESSURE: 70 MMHG | SYSTOLIC BLOOD PRESSURE: 110 MMHG

## 2020-06-12 PROCEDURE — 99213 OFFICE O/P EST LOW 20 MIN: CPT | Performed by: FAMILY MEDICINE

## 2020-06-12 RX ORDER — FLUTICASONE PROPIONATE 50 MCG
1 SPRAY, SUSPENSION (ML) NASAL DAILY
Qty: 3 BOTTLE | Refills: 2 | Status: CANCELLED | OUTPATIENT
Start: 2020-06-12

## 2020-06-12 RX ORDER — ATENOLOL 50 MG/1
TABLET ORAL
Qty: 90 TABLET | Refills: 1 | Status: CANCELLED | OUTPATIENT
Start: 2020-06-12

## 2020-06-12 RX ORDER — FLUTICASONE PROPIONATE 50 MCG
1 SPRAY, SUSPENSION (ML) NASAL DAILY
Qty: 1 BOTTLE | Refills: 3 | Status: SHIPPED
Start: 2020-06-12 | End: 2021-01-11 | Stop reason: SINTOL

## 2020-06-12 RX ORDER — DOXYCYCLINE HYCLATE 100 MG
100 TABLET ORAL 2 TIMES DAILY
Qty: 20 TABLET | Refills: 0 | Status: SHIPPED | OUTPATIENT
Start: 2020-06-12 | End: 2020-06-22

## 2020-06-12 RX ORDER — ATENOLOL 50 MG/1
TABLET ORAL
Qty: 90 TABLET | Refills: 1 | Status: SHIPPED
Start: 2020-06-12 | End: 2021-01-11 | Stop reason: SDUPTHER

## 2020-06-12 ASSESSMENT — ENCOUNTER SYMPTOMS
APNEA: 0
ABDOMINAL PAIN: 0
CHEST TIGHTNESS: 0
FACIAL SWELLING: 0
ABDOMINAL DISTENTION: 0
EYE REDNESS: 0
EYE DISCHARGE: 0
PHOTOPHOBIA: 0
SINUS PRESSURE: 0
ANAL BLEEDING: 0
VOICE CHANGE: 0
DIARRHEA: 0
BLOOD IN STOOL: 0
RHINORRHEA: 0
BACK PAIN: 1
RECTAL PAIN: 0
CHOKING: 0
BLURRED VISION: 0
TROUBLE SWALLOWING: 0
RESPIRATORY NEGATIVE: 1
STRIDOR: 0
CONSTIPATION: 0
VOMITING: 0
ALLERGIC/IMMUNOLOGIC NEGATIVE: 1
EYE PAIN: 0
NAUSEA: 0
COLOR CHANGE: 0
SHORTNESS OF BREATH: 0
COUGH: 0
WHEEZING: 0
SINUS PAIN: 0
SORE THROAT: 0
EYE ITCHING: 0
ORTHOPNEA: 0

## 2020-06-12 NOTE — PROGRESS NOTES
rate and regular rhythm. Pulses: Normal pulses. Heart sounds: Normal heart sounds. No murmur. No friction rub. No gallop. Pulmonary:      Effort: Pulmonary effort is normal. No respiratory distress. Breath sounds: Normal breath sounds. No stridor. No wheezing, rhonchi or rales. Chest:      Chest wall: No tenderness. Abdominal:      General: Bowel sounds are normal. There is no distension or abdominal bruit. Palpations: Abdomen is soft. Abdomen is not rigid. There is no shifting dullness, fluid wave, hepatomegaly, splenomegaly, mass or pulsatile mass. Tenderness: There is no abdominal tenderness. There is no right CVA tenderness, left CVA tenderness, guarding or rebound. Negative signs include Nguyen's sign and McBurney's sign. Hernia: No hernia is present. There is no hernia in the ventral area, right inguinal area or left inguinal area. Genitourinary:     Vagina: Normal.   Musculoskeletal:         General: No swelling, tenderness, deformity or signs of injury. Right knee: She exhibits normal range of motion, no swelling, no effusion, no ecchymosis, no deformity, no laceration, no erythema, normal alignment, no LCL laxity, normal patellar mobility, no bony tenderness, normal meniscus and no MCL laxity. No tenderness found. No medial joint line, no lateral joint line, no MCL, no LCL and no patellar tendon tenderness noted. Right lower leg: No edema. Left lower leg: No edema. Lymphadenopathy:      Cervical: No cervical adenopathy. Skin:     General: Skin is warm. Coloration: Skin is not jaundiced or pale. Findings: No bruising, erythema, lesion or rash. Comments: Tick bite to the nape of her neck    Neurological:      General: No focal deficit present. Mental Status: She is alert and oriented to person, place, and time. Cranial Nerves: No cranial nerve deficit. Sensory: No sensory deficit.       Motor: No weakness or abnormal muscle tone.      Coordination: Coordination normal.      Gait: Gait normal.      Deep Tendon Reflexes: Reflexes are normal and symmetric. Reflexes normal.         ASSESSMENT/PLAN  Eunice was seen today for insect bite. Diagnoses and all orders for this visit:    Essential hypertension ---controlled   --patient is instructed on low to moderate sodium ( 2 to 2.5 grams ), daily    Also to increase potassium in the diet to about 3.5 grams daily    Literature is provided     ---VASCULAR PANEL  A) asa, plavix, aggrenox  B) coumadin, pletal, tzd, statin  C) ace, hctz, folic, ccb  D) cannikinumab, fish oils     ---CARDIAC---asa, ace, BETA, statin, hctz, ( ccb )    -     atenolol (TENORMIN) 50 MG tablet; TAKE ONE TABLET BY MOUTH EVERY DAY    Sinusitis, unspecified chronicity, unspecified location  -     fluticasone (FLONASE) 50 MCG/ACT nasal spray; 1 spray by Nasal route daily    Cellulitis of neck  -     doxycycline hyclate (VIBRA-TABS) 100 MG tablet; Take 1 tablet by mouth 2 times daily for 10 days  PLAN--debridement of debris and dead tissue               Clean and dress       Tick bite, initial encounter  -     diclofenac sodium (VOLTAREN) 1 % GEL;  Apply 2 g topically 4 times daily        Outpatient Encounter Medications as of 6/12/2020   Medication Sig Dispense Refill    doxycycline hyclate (VIBRA-TABS) 100 MG tablet Take 1 tablet by mouth 2 times daily for 10 days 20 tablet 0    diclofenac sodium (VOLTAREN) 1 % GEL Apply 2 g topically 4 times daily 2 Tube 1    atenolol (TENORMIN) 50 MG tablet TAKE ONE TABLET BY MOUTH EVERY DAY 90 tablet 1    fluticasone (FLONASE) 50 MCG/ACT nasal spray 1 spray by Nasal route daily 1 Bottle 3    traZODone (DESYREL) 50 MG tablet Take 1 tablet by mouth nightly as needed for Sleep 30 tablet 5    promethazine (PHENERGAN) 25 MG tablet Take 1 tablet by mouth daily as needed for Nausea 90 tablet 0    albuterol sulfate HFA (VENTOLIN HFA) 108 (90 Base) MCG/ACT inhaler Inhale 2 puffs into the

## 2021-01-07 DIAGNOSIS — I10 ESSENTIAL HYPERTENSION: ICD-10-CM

## 2021-01-07 RX ORDER — ATENOLOL 50 MG/1
TABLET ORAL
Qty: 90 TABLET | Refills: 3 | OUTPATIENT
Start: 2021-01-07

## 2021-01-11 ENCOUNTER — VIRTUAL VISIT (OUTPATIENT)
Dept: FAMILY MEDICINE CLINIC | Age: 66
End: 2021-01-11
Payer: COMMERCIAL

## 2021-01-11 DIAGNOSIS — R53.83 FATIGUE, UNSPECIFIED TYPE: ICD-10-CM

## 2021-01-11 DIAGNOSIS — E78.5 DYSLIPIDEMIA: ICD-10-CM

## 2021-01-11 DIAGNOSIS — K57.92 ACUTE DIVERTICULITIS: Primary | ICD-10-CM

## 2021-01-11 DIAGNOSIS — R73.01 IFG (IMPAIRED FASTING GLUCOSE): ICD-10-CM

## 2021-01-11 DIAGNOSIS — I10 ESSENTIAL HYPERTENSION: ICD-10-CM

## 2021-01-11 PROCEDURE — 99214 OFFICE O/P EST MOD 30 MIN: CPT | Performed by: FAMILY MEDICINE

## 2021-01-11 RX ORDER — ATENOLOL 50 MG/1
TABLET ORAL
Qty: 90 TABLET | Refills: 1 | Status: SHIPPED
Start: 2021-01-11 | End: 2021-01-11 | Stop reason: SDUPTHER

## 2021-01-11 RX ORDER — CIPROFLOXACIN 750 MG/1
750 TABLET, FILM COATED ORAL 2 TIMES DAILY
Qty: 20 TABLET | Refills: 0 | Status: SHIPPED | OUTPATIENT
Start: 2021-01-11 | End: 2021-01-21

## 2021-01-11 RX ORDER — METRONIDAZOLE 500 MG/1
500 TABLET ORAL 3 TIMES DAILY
Qty: 30 TABLET | Refills: 0 | Status: SHIPPED | OUTPATIENT
Start: 2021-01-11 | End: 2021-01-21

## 2021-01-11 RX ORDER — ATENOLOL 50 MG/1
TABLET ORAL
Qty: 90 TABLET | Refills: 1 | Status: SHIPPED
Start: 2021-01-11 | End: 2021-04-09

## 2021-01-11 ASSESSMENT — ENCOUNTER SYMPTOMS
STRIDOR: 0
PHOTOPHOBIA: 0
COUGH: 0
DIARRHEA: 0
VOMITING: 0
FACIAL SWELLING: 0
VOICE CHANGE: 0
NAUSEA: 0
CHEST TIGHTNESS: 0
SINUS PAIN: 0
WHEEZING: 0
BLURRED VISION: 0
CONSTIPATION: 0
RECTAL PAIN: 0
EYE PAIN: 0
EYE DISCHARGE: 0
BLOOD IN STOOL: 0
EYE ITCHING: 0
ABDOMINAL PAIN: 1
ORTHOPNEA: 0
EYE REDNESS: 0
SHORTNESS OF BREATH: 0
TROUBLE SWALLOWING: 0
RHINORRHEA: 0
SINUS PRESSURE: 0
BACK PAIN: 1
ALLERGIC/IMMUNOLOGIC NEGATIVE: 1
RESPIRATORY NEGATIVE: 1
SORE THROAT: 0
ANAL BLEEDING: 0
CHOKING: 0
APNEA: 0
ABDOMINAL DISTENTION: 0
COLOR CHANGE: 0

## 2021-01-11 NOTE — PROGRESS NOTES
malaise/fatigue, neck pain, orthopnea, palpitations, peripheral edema, PND, shortness of breath or sweats. Risk factors for coronary artery disease include post-menopausal state and family history. Past treatments include lifestyle changes and beta blockers. The current treatment provides significant improvement. Compliance problems include exercise and diet. There is no history of angina, kidney disease, CAD/MI, CVA, heart failure, left ventricular hypertrophy, PVD or retinopathy. There is no history of chronic renal disease, coarctation of the aorta, hyperaldosteronism, hypercortisolism, hyperparathyroidism, a hypertension causing med, pheochromocytoma, renovascular disease, sleep apnea or a thyroid problem. ROS:  Review of Systems   Constitutional: Positive for fatigue. Negative for activity change, appetite change, chills, diaphoresis, fever, malaise/fatigue and unexpected weight change. HENT: Negative for congestion, dental problem, drooling, ear discharge, ear pain, facial swelling, hearing loss, mouth sores, nosebleeds, postnasal drip, rhinorrhea, sinus pressure, sinus pain, sneezing, sore throat, tinnitus, trouble swallowing and voice change. Eyes: Negative for blurred vision, photophobia, pain, discharge, redness, itching and visual disturbance. Respiratory: Negative. Negative for apnea, cough, choking, chest tightness, shortness of breath, wheezing and stridor. Cardiovascular: Negative. Negative for chest pain, palpitations, orthopnea, leg swelling and PND. Gastrointestinal: Positive for abdominal pain (left lower quadrant). Negative for abdominal distention, anal bleeding, blood in stool, constipation, diarrhea, nausea, rectal pain and vomiting. Endocrine: Negative. Negative for cold intolerance, heat intolerance, polydipsia, polyphagia and polyuria. Genitourinary: Negative.   Negative for decreased urine volume, difficulty urinating, dyspareunia, dysuria, enuresis, flank pain, frequency, genital sores, hematuria and urgency. Musculoskeletal: Positive for arthralgias (to her hands), back pain and myalgias. Negative for gait problem, joint swelling, neck pain and neck stiffness. Skin: Negative for color change, pallor, rash and wound. C/O a tick bite to her neck   Allergic/Immunologic: Negative. Negative for environmental allergies, food allergies and immunocompromised state. Neurological: Negative. Negative for dizziness, tremors, seizures, syncope, facial asymmetry, speech difficulty, weakness, light-headedness, numbness and headaches. Hematological: Negative. Negative for adenopathy. Does not bruise/bleed easily. Psychiatric/Behavioral: Positive for sleep disturbance. Negative for agitation, behavioral problems, confusion, decreased concentration, dysphoric mood, hallucinations, self-injury and suicidal ideas. The patient is nervous/anxious. The patient is not hyperactive. Past Medical/Surgical Hx;  Reviewed with patient      Diagnosis Date    Asthma     GERD (gastroesophageal reflux disease)     Hernia     Hypertension      Past Surgical History:   Procedure Laterality Date    CARPAL TUNNEL RELEASE       SECTION      HYSTERECTOMY, VAGINAL      LASIK      NASAL SEPTUM SURGERY         Past Family Hx:  Reviewed with patient      Problem Relation Age of Onset    Stroke Mother     Heart Disease Mother     Heart Disease Father     Arthritis Sister     Glaucoma Brother        Social Hx:  Reviewed with patient  Social History     Tobacco Use    Smoking status: Never Smoker    Smokeless tobacco: Never Used   Substance Use Topics    Alcohol use: No       OBJECTIVE  LMP  (LMP Unknown)     Problem List:  Maycodnharjinder does not have any pertinent problems on file.     PHYS EX:  General: Awake/Alert/Oriented/No Acute Distress    The 10-year ASCVD risk score (Shivani Mayer et al., 2013) is: 5.4%    Values used to calculate the score:      Age: 76 years 1/11/2021   Medication Sig Dispense Refill    atenolol (TENORMIN) 50 MG tablet TAKE ONE TABLET BY MOUTH EVERY DAY 90 tablet 1    metroNIDAZOLE (FLAGYL) 500 MG tablet Take 1 tablet by mouth 3 times daily for 10 days 30 tablet 0    ciprofloxacin (CIPRO) 750 MG tablet Take 1 tablet by mouth 2 times daily for 10 days 20 tablet 0    promethazine (PHENERGAN) 25 MG tablet Take 1 tablet by mouth daily as needed for Nausea 90 tablet 0    albuterol sulfate HFA (VENTOLIN HFA) 108 (90 Base) MCG/ACT inhaler Inhale 2 puffs into the lungs every 6 hours as needed for Wheezing 3 Inhaler 1    beclomethasone (QVAR) 80 MCG/ACT inhaler Inhale 1 puff into the lungs 2 times daily 3 Inhaler 1    [DISCONTINUED] atenolol (TENORMIN) 50 MG tablet TAKE ONE TABLET BY MOUTH EVERY DAY 90 tablet 1    [DISCONTINUED] traZODone (DESYREL) 50 MG tablet TAKE 1 TABLET BY MOUTH NIGHTLY AS NEEDED FOR SLEEP (Patient not taking: Reported on 1/11/2021) 30 tablet 2    [DISCONTINUED] diclofenac sodium (VOLTAREN) 1 % GEL Apply 2 g topically 4 times daily 2 Tube 1    [DISCONTINUED] atenolol (TENORMIN) 50 MG tablet TAKE ONE TABLET BY MOUTH EVERY DAY 90 tablet 1    [DISCONTINUED] fluticasone (FLONASE) 50 MCG/ACT nasal spray 1 spray by Nasal route daily (Patient not taking: Reported on 1/11/2021) 1 Bottle 3     No facility-administered encounter medications on file as of 1/11/2021. No follow-ups on file.         Reviewed recent labs related to Eunice's current problems      Discussed importance of regular Health Maintenance follow up  Health Maintenance   Topic    Hepatitis C screen     Breast cancer screen     Diabetes screen     Pneumococcal 65+ years Vaccine (1 of 1 - PPSV23)    Lipid screen     DTaP/Tdap/Td vaccine (2 - Td)    Colon cancer screen colonoscopy     DEXA (modify frequency per FRAX score)     Flu vaccine     Shingles Vaccine     Hepatitis A vaccine     Hepatitis B vaccine     Hib vaccine     Meningococcal (ACWY) vaccine

## 2021-01-13 ENCOUNTER — HOSPITAL ENCOUNTER (OUTPATIENT)
Age: 66
Discharge: HOME | End: 2021-01-13
Payer: COMMERCIAL

## 2021-01-13 DIAGNOSIS — I10 ESSENTIAL HYPERTENSION: ICD-10-CM

## 2021-01-13 DIAGNOSIS — R73.01 IFG (IMPAIRED FASTING GLUCOSE): ICD-10-CM

## 2021-01-13 DIAGNOSIS — K57.92 ACUTE DIVERTICULITIS: ICD-10-CM

## 2021-01-13 DIAGNOSIS — E78.5 DYSLIPIDEMIA: ICD-10-CM

## 2021-01-13 DIAGNOSIS — R53.83 FATIGUE, UNSPECIFIED TYPE: ICD-10-CM

## 2021-01-13 LAB
ALBUMIN SERPL-MCNC: 4 G/DL (ref 3.5–5.2)
ALP BLD-CCNC: 78 U/L (ref 35–104)
ALT SERPL-CCNC: 12 U/L (ref 0–32)
ANION GAP SERPL CALCULATED.3IONS-SCNC: 10 MMOL/L (ref 7–16)
AST SERPL-CCNC: 13 U/L (ref 0–31)
BASOPHILS ABSOLUTE: 0.03 E9/L (ref 0–0.2)
BASOPHILS RELATIVE PERCENT: 0.6 % (ref 0–2)
BILIRUB SERPL-MCNC: 0.4 MG/DL (ref 0–1.2)
BUN BLDV-MCNC: 16 MG/DL (ref 8–23)
CALCIUM SERPL-MCNC: 9.6 MG/DL (ref 8.6–10.2)
CHLORIDE BLD-SCNC: 103 MMOL/L (ref 98–107)
CHOLESTEROL, TOTAL: 179 MG/DL (ref 0–199)
CO2: 28 MMOL/L (ref 22–29)
CREAT SERPL-MCNC: 0.8 MG/DL (ref 0.5–1)
EOSINOPHILS ABSOLUTE: 0.1 E9/L (ref 0.05–0.5)
EOSINOPHILS RELATIVE PERCENT: 1.9 % (ref 0–6)
GFR AFRICAN AMERICAN: >60
GFR NON-AFRICAN AMERICAN: >60 ML/MIN/1.73
GLUCOSE BLD-MCNC: 105 MG/DL (ref 74–99)
HBA1C MFR BLD: 5.4 % (ref 4–5.6)
HCT VFR BLD CALC: 43.9 % (ref 34–48)
HDLC SERPL-MCNC: 81 MG/DL
HEMOGLOBIN: 15.1 G/DL (ref 11.5–15.5)
IMMATURE GRANULOCYTES #: 0 E9/L
IMMATURE GRANULOCYTES %: 0 % (ref 0–5)
LDL CHOLESTEROL CALCULATED: 83 MG/DL (ref 0–99)
LYMPHOCYTES ABSOLUTE: 1.49 E9/L (ref 1.5–4)
LYMPHOCYTES RELATIVE PERCENT: 28.1 % (ref 20–42)
MCH RBC QN AUTO: 30.6 PG (ref 26–35)
MCHC RBC AUTO-ENTMCNC: 34.4 % (ref 32–34.5)
MCV RBC AUTO: 88.9 FL (ref 80–99.9)
MONOCYTES ABSOLUTE: 0.41 E9/L (ref 0.1–0.95)
MONOCYTES RELATIVE PERCENT: 7.7 % (ref 2–12)
NEUTROPHILS ABSOLUTE: 3.28 E9/L (ref 1.8–7.3)
NEUTROPHILS RELATIVE PERCENT: 61.7 % (ref 43–80)
PDW BLD-RTO: 11.9 FL (ref 11.5–15)
PLATELET # BLD: 218 E9/L (ref 130–450)
PMV BLD AUTO: 10.4 FL (ref 7–12)
POTASSIUM SERPL-SCNC: 4.4 MMOL/L (ref 3.5–5)
RBC # BLD: 4.94 E12/L (ref 3.5–5.5)
SODIUM BLD-SCNC: 141 MMOL/L (ref 132–146)
TOTAL PROTEIN: 7.1 G/DL (ref 6.4–8.3)
TRIGL SERPL-MCNC: 74 MG/DL (ref 0–149)
TSH SERPL DL<=0.05 MIU/L-ACNC: 1.49 UIU/ML (ref 0.27–4.2)
URIC ACID, SERUM: 6.4 MG/DL (ref 2.4–5.7)
VLDLC SERPL CALC-MCNC: 15 MG/DL
WBC # BLD: 5.3 E9/L (ref 4.5–11.5)

## 2021-01-13 PROCEDURE — 85025 COMPLETE CBC W/AUTO DIFF WBC: CPT

## 2021-01-13 PROCEDURE — 84550 ASSAY OF BLOOD/URIC ACID: CPT

## 2021-01-13 PROCEDURE — 80061 LIPID PANEL: CPT

## 2021-01-13 PROCEDURE — 36415 COLL VENOUS BLD VENIPUNCTURE: CPT

## 2021-01-13 PROCEDURE — 80053 COMPREHEN METABOLIC PANEL: CPT

## 2021-01-13 PROCEDURE — 83036 HEMOGLOBIN GLYCOSYLATED A1C: CPT

## 2021-01-13 PROCEDURE — 84443 ASSAY THYROID STIM HORMONE: CPT

## 2021-02-22 ENCOUNTER — TELEPHONE (OUTPATIENT)
Dept: FAMILY MEDICINE CLINIC | Age: 66
End: 2021-02-22

## 2021-02-22 NOTE — TELEPHONE ENCOUNTER
Pt called in she wanted to know if she can get a script for ativan to sleep, she said she tried other medications in the past but it made her hair fall out.  Also pt wanted to let us know she received her first covid vaccine 2/1 and her second one today 2/22

## 2021-02-23 NOTE — TELEPHONE ENCOUNTER
She needs to see a sleep specialist  This is not to do with [facility]. It is to do with treating her properly without benzodiazepines .  These are very addicting and very bad medications  Set up Dr Anaid Alvares for her

## 2021-02-23 NOTE — TELEPHONE ENCOUNTER
Then it is melatonin 10 mg, magnesium 225 mg and zinc 12.5 mg at 9 every night  Get her back in to see Dr Larry Levine

## 2021-02-23 NOTE — TELEPHONE ENCOUNTER
Spoke with patient. She does not want to go back to sleep specialist.  States Dr. Esperanza Toscano did not have time for her. States she is not sure what she is going to do. States she cannot get a good night's sleep.   Electronically signed by Roberto Pierce on 2/23/2021 at 10:03 AM

## 2021-02-23 NOTE — TELEPHONE ENCOUNTER
Patient understands the risks of Ativan. She isn't comfortable taking the Tylenol PM every single night. Wants to know if there is something else  You would recommend to help. States she could not take the Trazodone due to hair loss. Please advise.

## 2021-02-23 NOTE — TELEPHONE ENCOUNTER
Patient informed that  does not prescribe Ativan. She used to take Ambien which helped her, but this was discontinued. She previously was on Trazodone which caused hair loss and did not help her sleep. She is currently taking Tylenol PMs which she states is bad for her health. She says she needs to get a doctor which is not affiliated with [facility] ([facility] because Dr Kimi Shi will not help her with her insomnia.

## 2021-02-25 NOTE — TELEPHONE ENCOUNTER
Voicemail message left for patient with the below message.   Electronically signed by Darren Mitchell on 2/25/2021 at 2:32 PM

## 2021-03-29 ENCOUNTER — OFFICE VISIT (OUTPATIENT)
Dept: FAMILY MEDICINE CLINIC | Age: 66
End: 2021-03-29
Payer: COMMERCIAL

## 2021-03-29 VITALS — WEIGHT: 145.2 LBS | HEIGHT: 59 IN | BODY MASS INDEX: 29.27 KG/M2

## 2021-03-29 DIAGNOSIS — Z82.49 FAMILY HISTORY OF HEART DISEASE: ICD-10-CM

## 2021-03-29 DIAGNOSIS — K76.89 LIVER CYST: ICD-10-CM

## 2021-03-29 DIAGNOSIS — I10 ESSENTIAL HYPERTENSION: Primary | ICD-10-CM

## 2021-03-29 DIAGNOSIS — Z12.31 ENCOUNTER FOR SCREENING MAMMOGRAM FOR MALIGNANT NEOPLASM OF BREAST: ICD-10-CM

## 2021-03-29 DIAGNOSIS — E66.01 MORBID OBESITY (HCC): ICD-10-CM

## 2021-03-29 DIAGNOSIS — L30.9 DERMATITIS: ICD-10-CM

## 2021-03-29 DIAGNOSIS — N30.01 ACUTE CYSTITIS WITH HEMATURIA: ICD-10-CM

## 2021-03-29 LAB
BILIRUBIN, POC: NORMAL
BLOOD URINE, POC: NORMAL
CLARITY, POC: CLEAR
COLOR, POC: YELLOW
GLUCOSE URINE, POC: NORMAL
KETONES, POC: NORMAL
LEUKOCYTE EST, POC: NORMAL
NITRITE, POC: NORMAL
PH, POC: 6
PROTEIN, POC: 30
SPECIFIC GRAVITY, POC: 1.03
UROBILINOGEN, POC: 0.2

## 2021-03-29 PROCEDURE — 81003 URINALYSIS AUTO W/O SCOPE: CPT | Performed by: FAMILY MEDICINE

## 2021-03-29 PROCEDURE — 96372 THER/PROPH/DIAG INJ SC/IM: CPT | Performed by: FAMILY MEDICINE

## 2021-03-29 PROCEDURE — 99214 OFFICE O/P EST MOD 30 MIN: CPT | Performed by: FAMILY MEDICINE

## 2021-03-29 RX ORDER — CEFDINIR 300 MG/1
300 CAPSULE ORAL 2 TIMES DAILY
Qty: 14 CAPSULE | Refills: 0 | Status: SHIPPED | OUTPATIENT
Start: 2021-03-29 | End: 2021-04-05

## 2021-03-29 RX ORDER — DEXAMETHASONE SODIUM PHOSPHATE 4 MG/ML
4 INJECTION, SOLUTION INTRA-ARTICULAR; INTRALESIONAL; INTRAMUSCULAR; INTRAVENOUS; SOFT TISSUE ONCE
Status: COMPLETED | OUTPATIENT
Start: 2021-03-29 | End: 2021-03-29

## 2021-03-29 RX ADMIN — DEXAMETHASONE SODIUM PHOSPHATE 4 MG: 4 INJECTION, SOLUTION INTRA-ARTICULAR; INTRALESIONAL; INTRAMUSCULAR; INTRAVENOUS; SOFT TISSUE at 11:31

## 2021-03-29 ASSESSMENT — PATIENT HEALTH QUESTIONNAIRE - PHQ9
SUM OF ALL RESPONSES TO PHQ QUESTIONS 1-9: 0
SUM OF ALL RESPONSES TO PHQ QUESTIONS 1-9: 0
2. FEELING DOWN, DEPRESSED OR HOPELESS: 0
SUM OF ALL RESPONSES TO PHQ QUESTIONS 1-9: 0
SUM OF ALL RESPONSES TO PHQ9 QUESTIONS 1 & 2: 0

## 2021-03-31 ENCOUNTER — OFFICE VISIT (OUTPATIENT)
Dept: CARDIOLOGY CLINIC | Age: 66
End: 2021-03-31
Payer: COMMERCIAL

## 2021-03-31 VITALS
HEIGHT: 59 IN | OXYGEN SATURATION: 98 % | HEART RATE: 58 BPM | BODY MASS INDEX: 29.43 KG/M2 | WEIGHT: 146 LBS | RESPIRATION RATE: 18 BRPM | DIASTOLIC BLOOD PRESSURE: 84 MMHG | SYSTOLIC BLOOD PRESSURE: 138 MMHG

## 2021-03-31 DIAGNOSIS — I10 ESSENTIAL HYPERTENSION: Primary | ICD-10-CM

## 2021-03-31 PROCEDURE — 99244 OFF/OP CNSLTJ NEW/EST MOD 40: CPT | Performed by: INTERNAL MEDICINE

## 2021-03-31 PROCEDURE — 93000 ELECTROCARDIOGRAM COMPLETE: CPT | Performed by: INTERNAL MEDICINE

## 2021-03-31 NOTE — PROGRESS NOTES
Chief Complaint   Patient presents with    Hypertension       Patient Active Problem List    Diagnosis Date Noted    Liver mass 07/12/2019    Gastroesophageal reflux disease without esophagitis 04/16/2019    Mild intermittent asthma without complication 49/36/6099    Overweight (BMI 25.0-29.9) 04/16/2019    Primary insomnia 04/16/2019    Primary osteoarthritis of right knee 07/13/2018    Skin lesion 04/18/2018    History of Nissen fundoplication 41/67/0010    Hypertension 10/07/2015    Allergic rhinitis 05/18/2015    Sciatica 04/06/2010       Current Outpatient Medications   Medication Sig Dispense Refill    diphenhydrAMINE-APAP, sleep, (TYLENOL PM EXTRA STRENGTH PO) Take 2 tablets by mouth nightly      cefdinir (OMNICEF) 300 MG capsule Take 1 capsule by mouth 2 times daily for 7 days 14 capsule 0    atenolol (TENORMIN) 50 MG tablet TAKE ONE TABLET BY MOUTH EVERY DAY 90 tablet 1    promethazine (PHENERGAN) 25 MG tablet Take 1 tablet by mouth daily as needed for Nausea 90 tablet 0    albuterol sulfate HFA (VENTOLIN HFA) 108 (90 Base) MCG/ACT inhaler Inhale 2 puffs into the lungs every 6 hours as needed for Wheezing 3 Inhaler 1    beclomethasone (QVAR) 80 MCG/ACT inhaler Inhale 1 puff into the lungs 2 times daily (Patient taking differently: Inhale 1 puff into the lungs 2 times daily as needed ) 3 Inhaler 1     No current facility-administered medications for this visit.          Allergies   Allergen Reactions    Molds & Smuts Swelling    Meperidine Nausea And Vomiting       Vitals:    03/31/21 0848   BP: 138/84   Site: Right Upper Arm   Position: Sitting   Cuff Size: Medium Adult   Pulse: 58   Resp: 18   SpO2: 98%   Weight: 146 lb (66.2 kg)   Height: 4' 11\" (1.499 m)       Social History     Socioeconomic History    Marital status:      Spouse name: Not on file    Number of children: Not on file    Years of education: Not on file    Highest education level: Not on file   Occupational History    Not on file   Social Needs    Financial resource strain: Not on file    Food insecurity     Worry: Not on file     Inability: Not on file    Transportation needs     Medical: Not on file     Non-medical: Not on file   Tobacco Use    Smoking status: Never Smoker    Smokeless tobacco: Never Used   Substance and Sexual Activity    Alcohol use: No     Comment: Joey green tea    Drug use: Never    Sexual activity: Not on file   Lifestyle    Physical activity     Days per week: Not on file     Minutes per session: Not on file    Stress: Not on file   Relationships    Social connections     Talks on phone: Not on file     Gets together: Not on file     Attends [employer] service: Not on file     Active member of club or organization: Not on file     Attends meetings of clubs or organizations: Not on file     Relationship status: Not on file    Intimate partner violence     Fear of current or ex partner: Not on file     Emotionally abused: Not on file     Physically abused: Not on file     Forced sexual activity: Not on file   Other Topics Concern    Not on file   Social History Narrative    Not on file       Family History   Problem Relation Age of Onset    Stroke Mother     Heart Disease Mother     Heart Surgery Mother     Heart Disease Father     Other Father     Arthritis Sister     Glaucoma Brother          SUBJECTIVE: Lechary Marie presents to the office today for consult - dr Julius Webber - for cardiac evaluation. Hx of longstanding HTN, on atenolol for yrs with home BP readings 120/80 on average. Had echo read by radiologist 2015 - report is benign. She complains of no cardiac symptoms nor decline in functional capacity and denies   chest pain, chest pressure/discomfort, claudication, dyspnea, exertional chest pressure/discomfort, fatigue, irregular heart beat, lower extremity edema, near-syncope, orthopnea, palpitations, paroxysmal nocturnal dyspnea, syncope and tachypnea. Hypertension    History of Nissen fundoplication    Skin lesion    Primary osteoarthritis of right knee    Gastroesophageal reflux disease without esophagitis    Mild intermittent asthma without complication    Overweight (BMI 25.0-29. 9)    Primary insomnia    Sciatica    Liver mass     No cardiac symptoms at good functional capacity  Normal CV exam and ekg  BP at target at home  AHA 10 yr CV risk 6.6% - no statin indication  No testing warranted  OV prn       Juan Cabral M.D  [id number] [facility]

## 2021-04-01 PROBLEM — K76.89 LIVER CYST: Status: ACTIVE | Noted: 2021-04-01

## 2021-04-01 PROBLEM — Z82.49 FAMILY HISTORY OF HEART DISEASE: Status: ACTIVE | Noted: 2021-04-01

## 2021-04-01 PROBLEM — L30.9 DERMATITIS: Status: ACTIVE | Noted: 2021-04-01

## 2021-04-01 PROBLEM — Z12.31 ENCOUNTER FOR SCREENING MAMMOGRAM FOR MALIGNANT NEOPLASM OF BREAST: Status: ACTIVE | Noted: 2021-04-01

## 2021-04-01 PROBLEM — N30.01 ACUTE CYSTITIS WITH HEMATURIA: Status: ACTIVE | Noted: 2021-04-01

## 2021-04-01 ASSESSMENT — ENCOUNTER SYMPTOMS
VOICE CHANGE: 0
PHOTOPHOBIA: 0
RECTAL PAIN: 0
EYE PAIN: 0
NAUSEA: 0
EYE REDNESS: 0
RESPIRATORY NEGATIVE: 1
CHOKING: 0
BACK PAIN: 0
WHEEZING: 0
BLOOD IN STOOL: 0
ABDOMINAL DISTENTION: 0
TROUBLE SWALLOWING: 0
FACIAL SWELLING: 0
SINUS PRESSURE: 0
VOMITING: 0
EYE DISCHARGE: 0
CONSTIPATION: 0
ALLERGIC/IMMUNOLOGIC NEGATIVE: 1
SORE THROAT: 0
COUGH: 0
ABDOMINAL PAIN: 0
EYES NEGATIVE: 1
RHINORRHEA: 0
CHEST TIGHTNESS: 0
SINUS PAIN: 0
ANAL BLEEDING: 0
COLOR CHANGE: 0
EYE ITCHING: 0
SHORTNESS OF BREATH: 0
DIARRHEA: 0
STRIDOR: 0

## 2021-04-02 NOTE — PROGRESS NOTES
Mckay Draper is a 76 y.o. female. HPI/Chief C/O:  Chief Complaint   Patient presents with    Urinary Tract Infection     symptoms started last week. C/O polyuria; burning with urination    Rash     rash on both arms; says she was weeding the other day. thinks she has poison ivy     Allergies   Allergen Reactions    Molds & Smuts Swelling    Meperidine Nausea And Vomiting     This 76year old female presents with dysuria and urinary frequency and urgency for 1 week. Pt states she had chills one day. Pt states she was outside weeding and got poison ivy. Pt has liver cyst, and family history of heart disease. ROS:  Review of Systems   Constitutional: Positive for fatigue. Negative for activity change, appetite change, chills, diaphoresis, fever and unexpected weight change. HENT: Negative. Negative for congestion, dental problem, drooling, ear discharge, ear pain, facial swelling, hearing loss, mouth sores, nosebleeds, postnasal drip, rhinorrhea, sinus pressure, sinus pain, sneezing, sore throat, tinnitus, trouble swallowing and voice change. Eyes: Negative. Negative for photophobia, pain, discharge, redness, itching and visual disturbance. Respiratory: Negative. Negative for cough, choking, chest tightness, shortness of breath, wheezing and stridor. Cardiovascular: Negative. Negative for chest pain, palpitations and leg swelling. Gastrointestinal: Negative for abdominal distention, abdominal pain, anal bleeding, blood in stool, constipation, diarrhea, nausea, rectal pain and vomiting. Endocrine: Negative. Negative for cold intolerance, heat intolerance, polydipsia, polyphagia and polyuria. Genitourinary: Positive for dysuria, frequency, hematuria and urgency. Negative for decreased urine volume, difficulty urinating, flank pain, genital sores, menstrual problem and pelvic pain. Musculoskeletal: Negative.   Negative for arthralgias, back pain, gait problem, joint swelling, myalgias, neck pain and neck stiffness. Skin: Negative. Negative for color change, pallor, rash and wound. Allergic/Immunologic: Negative. Neurological: Negative. Negative for dizziness, tremors, seizures, syncope, facial asymmetry, speech difficulty, weakness, light-headedness, numbness and headaches. Hematological: Negative. Negative for adenopathy. Does not bruise/bleed easily. Psychiatric/Behavioral: Positive for sleep disturbance. Negative for agitation, behavioral problems, confusion, decreased concentration, dysphoric mood, hallucinations, self-injury and suicidal ideas. The patient is nervous/anxious. The patient is not hyperactive. Past Medical/Surgical Hx;  Reviewed with patient      Diagnosis Date    Asthma     GERD (gastroesophageal reflux disease)     Hernia     Hypertension      Past Surgical History:   Procedure Laterality Date    CARPAL TUNNEL RELEASE       SECTION      GASTRIC FUNDOPLICATION      HYSTERECTOMY, VAGINAL      LASIK      NASAL SEPTUM SURGERY         Past Family Hx:  Reviewed with patient      Problem Relation Age of Onset    Stroke Mother     Heart Disease Mother     Heart Surgery Mother     Heart Disease Father     Other Father     Arthritis Sister     Glaucoma Brother        Social Hx:  Reviewed with patient  Social History     Tobacco Use    Smoking status: Never Smoker    Smokeless tobacco: Never Used   Substance Use Topics    Alcohol use: No     Comment: Joey green tea       OBJECTIVE  Ht 4' 11\" (1.499 m)   Wt 145 lb 3.2 oz (65.9 kg)   LMP  (LMP Unknown)   Breastfeeding No   BMI 29.33 kg/m²     Problem List:  Eunice does not have any pertinent problems on file. PHYS EX:  Physical Exam  Vitals signs and nursing note reviewed. Constitutional:       General: She is not in acute distress. Appearance: Normal appearance. She is well-developed. She is obese. She is not ill-appearing, toxic-appearing or diaphoretic. Comments: Patient has morbid obesity. Patient instructed on low calorie, healthy diet. HENT:      Head: Normocephalic and atraumatic. Nose: Nose normal. No congestion or rhinorrhea. Eyes:      General: No scleral icterus. Right eye: No discharge. Left eye: No discharge. Conjunctiva/sclera: Conjunctivae normal.      Pupils: Pupils are equal, round, and reactive to light. Neck:      Musculoskeletal: Normal range of motion and neck supple. No neck rigidity or muscular tenderness. Thyroid: No thyromegaly. Vascular: No carotid bruit or JVD. Trachea: No tracheal deviation. Cardiovascular:      Rate and Rhythm: Normal rate and regular rhythm. Pulses: Normal pulses. Heart sounds: Normal heart sounds. No murmur. No friction rub. No gallop. Pulmonary:      Effort: Pulmonary effort is normal. No respiratory distress. Breath sounds: Normal breath sounds. No stridor. No wheezing, rhonchi or rales. Chest:      Chest wall: No tenderness. Abdominal:      General: Bowel sounds are normal. There is no distension. Palpations: Abdomen is soft. There is no mass. Tenderness: There is no abdominal tenderness. There is no right CVA tenderness, left CVA tenderness, guarding or rebound. Hernia: No hernia is present. Musculoskeletal: Normal range of motion. General: No swelling, tenderness, deformity or signs of injury. Right lower leg: No edema. Left lower leg: No edema. Lymphadenopathy:      Cervical: No cervical adenopathy. Skin:     General: Skin is warm. Coloration: Skin is not jaundiced or pale. Findings: No bruising, erythema, lesion or rash. Neurological:      General: No focal deficit present. Mental Status: She is alert and oriented to person, place, and time. Cranial Nerves: No cranial nerve deficit. Sensory: No sensory deficit. Motor: No weakness or abnormal muscle tone. Coordination: Coordination normal.      Gait: Gait normal.      Deep Tendon Reflexes: Reflexes are normal and symmetric. Reflexes normal.   Psychiatric:         Mood and Affect: Mood normal.         Behavior: Behavior normal.         Thought Content: Thought content normal.         Judgment: Judgment normal.         ASSESSMENT/PLAN  Eunice was seen today for urinary tract infection and rash. Diagnoses and all orders for this visit:    Essential hypertension  Controlled. BB, low salt diet. Liver cyst  -     Cancel: US LIVER; Future  -     US ABDOMEN LIMITED; Future  Stable. Dermatitis  -     dexamethasone (DECADRON) injection 4 mg  Not controlled. Acute cystitis with hematuria  -     POCT Urinalysis No Micro (Auto)  -     cefdinir (OMNICEF) 300 MG capsule; Take 1 capsule by mouth 2 times daily for 7 days  Not controlled. Omnicef, Patient instructed on treatment and prevention UTI. Encounter for screening mammogram for malignant neoplasm of breast  -     [facility] [place] DIGITAL SCREEN W OR WO CAD BILATERAL; Future  Pt instructed on ally. Rene.   Family history of heart disease  -     Steph Davila MD, Cardiology, [facility]. Morbid obesity  Not controlled. Patient has morbid obesity. Patient instructed on low calorie, healthy diet. Pt instructed if any worse go ED ASAP.     Outpatient Encounter Medications as of 3/29/2021   Medication Sig Dispense Refill    diphenhydrAMINE-APAP, sleep, (TYLENOL PM EXTRA STRENGTH PO) Take 2 tablets by mouth nightly      cefdinir (OMNICEF) 300 MG capsule Take 1 capsule by mouth 2 times daily for 7 days 14 capsule 0    atenolol (TENORMIN) 50 MG tablet TAKE ONE TABLET BY MOUTH EVERY DAY 90 tablet 1    promethazine (PHENERGAN) 25 MG tablet Take 1 tablet by mouth daily as needed for Nausea 90 tablet 0    albuterol sulfate HFA (VENTOLIN HFA) 108 (90 Base) MCG/ACT inhaler Inhale 2 puffs into the lungs every 6 hours as needed for Wheezing 3 Inhaler 1    beclomethasone (QVAR) 80 MCG/ACT inhaler Inhale 1 puff into the lungs 2 times daily (Patient taking differently: Inhale 1 puff into the lungs 2 times daily as needed ) 3 Inhaler 1    [] dexamethasone (DECADRON) injection 4 mg        No facility-administered encounter medications on file as of 3/29/2021. Return in about 2 weeks (around 2021).         Reviewed recent labs related to Eunice's current problems      Discussed importance of regular Health Maintenance follow up  Health Maintenance   Topic    Hepatitis C screen     Breast cancer screen     Pneumococcal 65+ years Vaccine (1 of 1 - PPSV23)    Diabetes screen     Lipid screen     DTaP/Tdap/Td vaccine (2 - Td)    Colon cancer screen colonoscopy     DEXA (modify frequency per FRAX score)     Flu vaccine     Shingles Vaccine     COVID-19 Vaccine     Hepatitis A vaccine     Hepatitis B vaccine     Hib vaccine     Meningococcal (ACWY) vaccine

## 2021-04-06 ENCOUNTER — TELEPHONE (OUTPATIENT)
Dept: FAMILY MEDICINE CLINIC | Age: 66
End: 2021-04-06

## 2021-04-06 DIAGNOSIS — J32.9 SINUSITIS, UNSPECIFIED CHRONICITY, UNSPECIFIED LOCATION: Primary | ICD-10-CM

## 2021-04-06 RX ORDER — METHYLPREDNISOLONE 4 MG/1
TABLET ORAL
Qty: 1 KIT | Refills: 0 | Status: SHIPPED | OUTPATIENT
Start: 2021-04-06 | End: 2021-04-12

## 2021-04-06 RX ORDER — GUAIFENESIN 600 MG/1
600 TABLET, EXTENDED RELEASE ORAL 2 TIMES DAILY
Qty: 30 TABLET | Refills: 0 | Status: SHIPPED | OUTPATIENT
Start: 2021-04-06 | End: 2021-04-21

## 2021-04-06 NOTE — TELEPHONE ENCOUNTER
This MA spoke with pt, pharmacy confirmed and Rx sent.     Electronically signed by Allyn Anders MA on 4/6/21 at 9:38 AM EDT

## 2021-04-06 NOTE — TELEPHONE ENCOUNTER
Patient called in stating that she has a sore throat and was wondering if something could be sent over to the pharmacy.

## 2021-04-08 ENCOUNTER — TELEPHONE (OUTPATIENT)
Dept: CARDIOLOGY CLINIC | Age: 66
End: 2021-04-08

## 2021-04-09 ENCOUNTER — OFFICE VISIT (OUTPATIENT)
Dept: FAMILY MEDICINE CLINIC | Age: 66
End: 2021-04-09
Payer: COMMERCIAL

## 2021-04-09 VITALS
WEIGHT: 143.8 LBS | DIASTOLIC BLOOD PRESSURE: 82 MMHG | SYSTOLIC BLOOD PRESSURE: 132 MMHG | OXYGEN SATURATION: 96 % | BODY MASS INDEX: 28.99 KG/M2 | TEMPERATURE: 98.2 F | HEIGHT: 59 IN | RESPIRATION RATE: 16 BRPM | HEART RATE: 87 BPM

## 2021-04-09 DIAGNOSIS — R73.01 IFG (IMPAIRED FASTING GLUCOSE): ICD-10-CM

## 2021-04-09 DIAGNOSIS — R53.83 FATIGUE, UNSPECIFIED TYPE: ICD-10-CM

## 2021-04-09 DIAGNOSIS — I10 ESSENTIAL HYPERTENSION: Primary | ICD-10-CM

## 2021-04-09 DIAGNOSIS — R30.0 DYSURIA: ICD-10-CM

## 2021-04-09 LAB
BILIRUBIN, POC: NEGATIVE
BLOOD URINE, POC: ABNORMAL
CLARITY, POC: ABNORMAL
COLOR, POC: YELLOW
GLUCOSE URINE, POC: NEGATIVE
KETONES, POC: NEGATIVE
LEUKOCYTE EST, POC: ABNORMAL
NITRITE, POC: NEGATIVE
PH, POC: 5.5
PROTEIN, POC: NEGATIVE
SPECIFIC GRAVITY, POC: 1.02
UROBILINOGEN, POC: 0.2

## 2021-04-09 PROCEDURE — 81002 URINALYSIS NONAUTO W/O SCOPE: CPT | Performed by: FAMILY MEDICINE

## 2021-04-09 PROCEDURE — 99213 OFFICE O/P EST LOW 20 MIN: CPT | Performed by: FAMILY MEDICINE

## 2021-04-09 RX ORDER — METOPROLOL SUCCINATE 25 MG/1
25 TABLET, EXTENDED RELEASE ORAL DAILY
Qty: 90 TABLET | Refills: 1 | Status: SHIPPED
Start: 2021-04-09 | End: 2021-09-09

## 2021-04-09 ASSESSMENT — ENCOUNTER SYMPTOMS
BACK PAIN: 1
BLOOD IN STOOL: 0
RESPIRATORY NEGATIVE: 1
STRIDOR: 0
PHOTOPHOBIA: 0
ORTHOPNEA: 0
EYE PAIN: 0
COLOR CHANGE: 0
SHORTNESS OF BREATH: 0
EYE ITCHING: 0
SINUS PRESSURE: 0
CONSTIPATION: 0
FACIAL SWELLING: 0
ABDOMINAL DISTENTION: 0
RHINORRHEA: 0
APNEA: 0
CHOKING: 0
CHEST TIGHTNESS: 0
TROUBLE SWALLOWING: 0
DIARRHEA: 0
WHEEZING: 0
ANAL BLEEDING: 0
EYE REDNESS: 0
VOICE CHANGE: 0
EYE DISCHARGE: 0
ALLERGIC/IMMUNOLOGIC NEGATIVE: 1
RECTAL PAIN: 0
SINUS PAIN: 0
BLURRED VISION: 0

## 2021-04-09 NOTE — PATIENT INSTRUCTIONS
Patient Education        Urinary Tract Infection (UTI) in Women: Care Instructions  Overview     A urinary tract infection, or UTI, is a general term for an infection anywhere between the kidneys and the urethra (where urine comes out). Most UTIs are bladder infections. They often cause pain or burning when you urinate. UTIs are caused by bacteria and can be cured with antibiotics. Be sure to complete your treatment so that the infection does not get worse. Follow-up care is a key part of your treatment and safety. Be sure to make and go to all appointments, and call your doctor if you are having problems. It's also a good idea to know your test results and keep a list of the medicines you take. How can you care for yourself at home? · Take your antibiotics as directed. Do not stop taking them just because you feel better. You need to take the full course of antibiotics. · Drink extra water and other fluids for the next day or two. This will help make the urine less concentrated and help wash out the bacteria that are causing the infection. (If you have kidney, heart, or liver disease and have to limit fluids, talk with your doctor before you increase the amount of fluids you drink.)  · Avoid drinks that are carbonated or have caffeine. They can irritate the bladder. · Urinate often. Try to empty your bladder each time. · To relieve pain, take a hot bath or lay a heating pad set on low over your lower belly or genital area. Never go to sleep with a heating pad in place. To prevent UTIs  · Drink plenty of water each day. This helps you urinate often, which clears bacteria from your system. (If you have kidney, heart, or liver disease and have to limit fluids, talk with your doctor before you increase the amount of fluids you drink.)  · Urinate when you need to. · If you are sexually active, urinate right after you have sex. · Change sanitary pads often.   · Avoid douches, bubble baths, feminine hygiene sprays, and other feminine hygiene products that have deodorants. · After going to the bathroom, wipe from front to back. When should you call for help? Call your doctor now or seek immediate medical care if:    · Symptoms such as fever, chills, nausea, or vomiting get worse or appear for the first time.     · You have new pain in your back just below your rib cage. This is called flank pain.     · There is new blood or pus in your urine.     · You have any problems with your antibiotic medicine. Watch closely for changes in your health, and be sure to contact your doctor if:    · You are not getting better after taking an antibiotic for 2 days.     · Your symptoms go away but then come back. Where can you learn more? Go to [email or web address] org and sign in to your [email or web address] account. Enter V591 in the Gust box to learn more about \"Urinary Tract Infection (UTI) in Women: Care Instructions. \"     If you do not have an account, please click on the \"Sign Up Now\" link. Current as of: June 29, 2020               Content Version: 12.8  © 5155-7926 [employer]wise, Incorporated. Care instructions adapted under license by [facility] [place] ([facility]). If you have questions about a medical condition or this instruction, always ask your healthcare professional. [address] any warranty or liability for your use of this information.

## 2021-04-09 NOTE — PROGRESS NOTES
Shailesh Huggins is a 76 y.o. female. HPI/Chief C/O:  Chief Complaint   Patient presents with    Dysuria     2 week follow up from UTI; patient states all UTI symptoms are resolved.  Pharyngitis     currently being treated for sinus drainage and sore throat.  Discuss Medications     To discuss possibly changing atenolol - patient is concerned about it prohibiting her melatonin, could this be making her insomnia worse? Wants to try Toprol XL 25mg instead    Results     To review liver imaging result     Allergies   Allergen Reactions    Molds & Smuts Swelling    Meperidine Nausea And Vomiting     The patient is here for a medication list and treatment planning review  We will go over our care planning goals as well as take care of all refills  We will set up labs as well   Her urine feels much better    Hypertension  This is a chronic problem. The current episode started more than 1 year ago. The problem is controlled. Associated symptoms include anxiety. Pertinent negatives include no blurred vision, chest pain, headaches, malaise/fatigue, neck pain, orthopnea, palpitations, peripheral edema, PND, shortness of breath or sweats. Risk factors for coronary artery disease include post-menopausal state and family history. Past treatments include lifestyle changes and beta blockers. The current treatment provides significant improvement. Compliance problems include exercise and diet. There is no history of angina, kidney disease, CAD/MI, CVA, heart failure, left ventricular hypertrophy, PVD or retinopathy. There is no history of chronic renal disease, coarctation of the aorta, hyperaldosteronism, hypercortisolism, hyperparathyroidism, a hypertension causing med, pheochromocytoma, renovascular disease, sleep apnea or a thyroid problem. ROS:  Review of Systems   Constitutional: Negative for activity change, appetite change, malaise/fatigue and unexpected weight change.    HENT: Negative for dental problem, drooling, ear discharge, ear pain, facial swelling, hearing loss, mouth sores, nosebleeds, postnasal drip, rhinorrhea, sinus pressure, sinus pain, sneezing, tinnitus, trouble swallowing and voice change. Eyes: Negative for blurred vision, photophobia, pain, discharge, redness, itching and visual disturbance. Respiratory: Negative. Negative for apnea, choking, chest tightness, shortness of breath, wheezing and stridor. Cardiovascular: Negative. Negative for chest pain, palpitations, orthopnea, leg swelling and PND. Gastrointestinal: Negative for abdominal distention, anal bleeding, blood in stool, constipation, diarrhea and rectal pain. Endocrine: Negative. Negative for cold intolerance, heat intolerance, polydipsia, polyphagia and polyuria. Genitourinary: Negative. Negative for decreased urine volume, difficulty urinating, dyspareunia, enuresis and genital sores. Musculoskeletal: Positive for back pain. Negative for gait problem, neck pain and neck stiffness. Skin: Negative for color change, pallor and wound. Allergic/Immunologic: Negative. Negative for environmental allergies, food allergies and immunocompromised state. Neurological: Negative. Negative for dizziness, tremors, seizures, syncope, facial asymmetry, speech difficulty, light-headedness and headaches. Hematological: Negative. Negative for adenopathy. Does not bruise/bleed easily. Psychiatric/Behavioral: Positive for sleep disturbance. Negative for agitation, behavioral problems, confusion, decreased concentration, dysphoric mood, hallucinations, self-injury and suicidal ideas. The patient is nervous/anxious. The patient is not hyperactive.          Past Medical/Surgical Hx;  Reviewed with patient      Diagnosis Date    Asthma     GERD (gastroesophageal reflux disease)     Hernia     Hypertension      Past Surgical History:   Procedure Laterality Date    CARPAL TUNNEL RELEASE       SECTION  GASTRIC FUNDOPLICATION      HYSTERECTOMY, VAGINAL      LASIK      NASAL SEPTUM SURGERY         Past Family Hx:  Reviewed with patient      Problem Relation Age of Onset    Stroke Mother     Heart Disease Mother     Heart Surgery Mother     Heart Disease Father     Other Father     Arthritis Sister     Glaucoma Brother        Social Hx:  Reviewed with patient  Social History     Tobacco Use    Smoking status: Never Smoker    Smokeless tobacco: Never Used   Substance Use Topics    Alcohol use: No     Comment: Joey green tea       OBJECTIVE  /82   Pulse 87   Temp 98.2 °F (36.8 °C)   Resp 16   Ht 4' 11\" (1.499 m)   Wt 143 lb 12.8 oz (65.2 kg)   LMP  (LMP Unknown)   SpO2 96%   BMI 29.04 kg/m²     Problem List:  Eunice does not have any pertinent problems on file. PHYS EX:  Physical Exam  Vitals signs and nursing note reviewed. Constitutional:       General: She is not in acute distress. Appearance: Normal appearance. She is well-developed. She is not ill-appearing, toxic-appearing or diaphoretic. HENT:      Head: Normocephalic and atraumatic. Right Ear: External ear normal. There is no impacted cerumen. Left Ear: External ear normal. There is no impacted cerumen. Nose: Nose normal. No congestion or rhinorrhea. Mouth/Throat:      Mouth: Mucous membranes are moist.      Pharynx: No oropharyngeal exudate or posterior oropharyngeal erythema. Eyes:      General: No scleral icterus. Right eye: No discharge. Left eye: No discharge. Extraocular Movements: Extraocular movements intact. Conjunctiva/sclera: Conjunctivae normal.      Pupils: Pupils are equal, round, and reactive to light. Neck:      Musculoskeletal: Normal range of motion and neck supple. No neck rigidity or muscular tenderness. Thyroid: No thyromegaly. Vascular: No carotid bruit or JVD. Trachea: No tracheal deviation.    Cardiovascular:      Rate and Rhythm: Normal rate and regular rhythm. Pulses: Normal pulses. Heart sounds: Normal heart sounds. No murmur. No friction rub. No gallop. Pulmonary:      Effort: Pulmonary effort is normal. No respiratory distress. Breath sounds: Normal breath sounds. No stridor. No wheezing, rhonchi or rales. Chest:      Chest wall: No tenderness. Abdominal:      General: Bowel sounds are normal. There is no distension or abdominal bruit. Palpations: Abdomen is soft. Abdomen is not rigid. There is no shifting dullness, fluid wave, hepatomegaly, splenomegaly, mass or pulsatile mass. Tenderness: There is no abdominal tenderness. There is no right CVA tenderness, left CVA tenderness, guarding or rebound. Negative signs include Nguyen's sign and McBurney's sign. Hernia: No hernia is present. There is no hernia in the ventral area or left inguinal area. Musculoskeletal:         General: No swelling, tenderness, deformity or signs of injury. Right knee: She exhibits normal range of motion, no swelling, no effusion, no ecchymosis, no deformity, no laceration, no erythema, normal alignment, no LCL laxity, normal patellar mobility, no bony tenderness, normal meniscus and no MCL laxity. No tenderness found. No medial joint line, no lateral joint line, no MCL, no LCL and no patellar tendon tenderness noted. Right lower leg: No edema. Left lower leg: No edema. Lymphadenopathy:      Cervical: No cervical adenopathy. Skin:     General: Skin is warm. Coloration: Skin is not jaundiced or pale. Findings: No bruising, erythema, lesion or rash. Neurological:      General: No focal deficit present. Mental Status: She is alert and oriented to person, place, and time. Cranial Nerves: No cranial nerve deficit. Sensory: No sensory deficit. Motor: No weakness or abnormal muscle tone.       Coordination: Coordination normal.      Gait: Gait normal.      Deep Tendon Reflexes: Reflexes are normal and symmetric. Reflexes normal.          --HEMATURIA = 1) AGE > 50 ( 4 )                             2) GROSS ( 0 )                             3) MALE ( 0 )                             4) SMOKER ( 0 )                             5) >25 RBC ( 0 )  0 TO 4 ( low risk )   5          ( 1.5 % )---low risk   6          ( 3.7 % )  7          ( 30 % )       ASSESSMENT/PLAN  Eunice was seen today for dysuria, pharyngitis, discuss medications and results. Diagnoses and all orders for this visit:    Essential hypertension ---controlled   --patient is instructed on low to moderate sodium ( 2 to 2.5 grams ), daily    Also to increase potassium in the diet to about 3.5 grams daily    Literature is provided   --cardiology want s her BB changed   -     metoprolol succinate (TOPROL XL) 25 MG extended release tablet; Take 1 tablet by mouth daily    Dysuria  -     POCT Urinalysis no Micro  -     US RETROPERITONEAL LIMITED; Future  Long talk on treatment and prevention  Literature is given       IFG (impaired fasting glucose)  --stable on current care planning  -- continue treatment as we are meeting goals       Fatigue, unspecified type  Long talk on treatment and prevention  Literature is given           Outpatient Encounter Medications as of 4/9/2021   Medication Sig Dispense Refill    metoprolol succinate (TOPROL XL) 25 MG extended release tablet Take 1 tablet by mouth daily 90 tablet 1    methylPREDNISolone (MEDROL DOSEPACK) 4 MG tablet Take by mouth.  1 kit 0    guaiFENesin (MUCINEX) 600 MG extended release tablet Take 1 tablet by mouth 2 times daily for 15 days 30 tablet 0    diphenhydrAMINE-APAP, sleep, (TYLENOL PM EXTRA STRENGTH PO) Take 2 tablets by mouth nightly      promethazine (PHENERGAN) 25 MG tablet Take 1 tablet by mouth daily as needed for Nausea 90 tablet 0    albuterol sulfate HFA (VENTOLIN HFA) 108 (90 Base) MCG/ACT inhaler Inhale 2 puffs into the lungs every 6 hours as needed for Wheezing 3 Inhaler 1    beclomethasone (QVAR) 80 MCG/ACT inhaler Inhale 1 puff into the lungs 2 times daily (Patient taking differently: Inhale 1 puff into the lungs 2 times daily as needed ) 3 Inhaler 1    [DISCONTINUED] atenolol (TENORMIN) 50 MG tablet TAKE ONE TABLET BY MOUTH EVERY DAY 90 tablet 1     No facility-administered encounter medications on file as of 4/9/2021. No follow-ups on file.         Reviewed recent labs related to Eunice's current problems      Discussed importance of regular Health Maintenance follow up  Health Maintenance   Topic    Hepatitis C screen     Breast cancer screen     Pneumococcal 65+ years Vaccine (1 of 1 - PPSV23)    Diabetes screen     Lipid screen     DTaP/Tdap/Td vaccine (2 - Td)    Colon cancer screen colonoscopy     DEXA (modify frequency per FRAX score)     Flu vaccine     Shingles Vaccine     COVID-19 Vaccine     Hepatitis A vaccine     Hepatitis B vaccine     Hib vaccine     Meningococcal (ACWY) vaccine

## 2021-05-01 PROBLEM — Z12.31 ENCOUNTER FOR SCREENING MAMMOGRAM FOR MALIGNANT NEOPLASM OF BREAST: Status: RESOLVED | Noted: 2021-04-01 | Resolved: 2021-05-01

## 2021-05-06 ENCOUNTER — HOSPITAL ENCOUNTER (OUTPATIENT)
Dept: MAMMOGRAPHY | Age: 66
Discharge: HOME | End: 2021-05-08
Payer: COMMERCIAL

## 2021-05-06 DIAGNOSIS — Z12.31 ENCOUNTER FOR SCREENING MAMMOGRAM FOR MALIGNANT NEOPLASM OF BREAST: ICD-10-CM

## 2021-05-06 PROCEDURE — 77067 SCR MAMMO BI INCL CAD: CPT

## 2021-09-08 DIAGNOSIS — I10 ESSENTIAL HYPERTENSION: ICD-10-CM

## 2021-09-10 RX ORDER — METOPROLOL SUCCINATE 25 MG/1
TABLET, EXTENDED RELEASE ORAL
Qty: 90 TABLET | Refills: 0 | Status: SHIPPED
Start: 2021-09-10 | End: 2021-10-05 | Stop reason: SDUPTHER

## 2021-09-11 DIAGNOSIS — I10 ESSENTIAL HYPERTENSION: ICD-10-CM

## 2021-09-13 RX ORDER — METOPROLOL SUCCINATE 25 MG/1
TABLET, EXTENDED RELEASE ORAL
Qty: 90 TABLET | Refills: 0 | OUTPATIENT
Start: 2021-09-13

## 2021-10-05 ENCOUNTER — OFFICE VISIT (OUTPATIENT)
Dept: FAMILY MEDICINE CLINIC | Age: 66
End: 2021-10-05
Payer: COMMERCIAL

## 2021-10-05 VITALS
TEMPERATURE: 97.9 F | WEIGHT: 148 LBS | HEIGHT: 59 IN | HEART RATE: 64 BPM | BODY MASS INDEX: 29.84 KG/M2 | OXYGEN SATURATION: 97 % | SYSTOLIC BLOOD PRESSURE: 122 MMHG | DIASTOLIC BLOOD PRESSURE: 76 MMHG | RESPIRATION RATE: 16 BRPM

## 2021-10-05 DIAGNOSIS — R73.01 IFG (IMPAIRED FASTING GLUCOSE): ICD-10-CM

## 2021-10-05 DIAGNOSIS — Z23 IMMUNIZATION DUE: ICD-10-CM

## 2021-10-05 DIAGNOSIS — I10 ESSENTIAL HYPERTENSION: Primary | ICD-10-CM

## 2021-10-05 DIAGNOSIS — E78.5 DYSLIPIDEMIA: ICD-10-CM

## 2021-10-05 DIAGNOSIS — R53.83 FATIGUE, UNSPECIFIED TYPE: ICD-10-CM

## 2021-10-05 PROCEDURE — 90694 VACC AIIV4 NO PRSRV 0.5ML IM: CPT | Performed by: FAMILY MEDICINE

## 2021-10-05 PROCEDURE — 90471 IMMUNIZATION ADMIN: CPT | Performed by: FAMILY MEDICINE

## 2021-10-05 PROCEDURE — 99213 OFFICE O/P EST LOW 20 MIN: CPT | Performed by: FAMILY MEDICINE

## 2021-10-05 RX ORDER — METOPROLOL SUCCINATE 25 MG/1
TABLET, EXTENDED RELEASE ORAL
Qty: 90 TABLET | Refills: 1 | Status: SHIPPED
Start: 2021-10-05 | End: 2022-03-16

## 2021-10-05 ASSESSMENT — ENCOUNTER SYMPTOMS
RESPIRATORY NEGATIVE: 1
EYE ITCHING: 0
BLURRED VISION: 0
FACIAL SWELLING: 0
RHINORRHEA: 0
CHOKING: 0
COLOR CHANGE: 0
SINUS PRESSURE: 0
COUGH: 0
APNEA: 0
ABDOMINAL PAIN: 0
VOICE CHANGE: 0
EYE PAIN: 0
PHOTOPHOBIA: 0
SORE THROAT: 0
RECTAL PAIN: 0
ABDOMINAL DISTENTION: 0
CHEST TIGHTNESS: 0
ORTHOPNEA: 0
BLOOD IN STOOL: 0
STRIDOR: 0
SHORTNESS OF BREATH: 0
WHEEZING: 0
GASTROINTESTINAL NEGATIVE: 1
SINUS PAIN: 0
NAUSEA: 0
ANAL BLEEDING: 0
ALLERGIC/IMMUNOLOGIC NEGATIVE: 1
TROUBLE SWALLOWING: 0
VOMITING: 0
BACK PAIN: 0
EYE DISCHARGE: 0
DIARRHEA: 0
CONSTIPATION: 0
EYE REDNESS: 0

## 2021-10-05 NOTE — PATIENT INSTRUCTIONS
Patient Education        Fatigue: Care Instructions  Your Care Instructions     Fatigue is a feeling of tiredness, exhaustion, or lack of energy. You may feel fatigue because of too much or not enough activity. It can also come from stress, lack of sleep, boredom, and poor diet. Many medical problems, such as viral infections, can cause fatigue. Emotional problems, especially depression, are often the cause of fatigue. Fatigue is most often a symptom of another problem. Treatment for fatigue depends on the cause. For example, if you have fatigue because you have a certain health problem, treating this problem also treats your fatigue. If depression or anxiety is the cause, treatment may help. Follow-up care is a key part of your treatment and safety. Be sure to make and go to all appointments, and call your doctor if you are having problems. It's also a good idea to know your test results and keep a list of the medicines you take. How can you care for yourself at home? · Get regular exercise. But don't overdo it. Go back and forth between rest and exercise. · Get plenty of rest.  · Eat a healthy diet. Do not skip meals, especially breakfast.  · Reduce your use of caffeine, tobacco, and alcohol. Caffeine is most often found in coffee, tea, cola drinks, and chocolate. · Limit medicines that can cause fatigue. This includes tranquilizers and cold and allergy medicines. When should you call for help? Watch closely for changes in your health, and be sure to contact your doctor if:    · You have new symptoms such as fever or a rash.     · Your fatigue gets worse.     · You have been feeling down, depressed, or hopeless. Or you may have lost interest in things that you usually enjoy.     · You are not getting better as expected. Where can you learn more? Go to https://estiven.PubNub. org and sign in to your [employer] ([employer] account.  Enter X236 in the [employer] box to learn more about \"Fatigue: Care Instructions. \"     If you do not have an account, please click on the \"Sign Up Now\" link. Current as of: July 1, 2021               Content Version: 13.0  © 0285-8989 Healthwise, Incorporated. Care instructions adapted under license by [facility] [place] ([facility]). If you have questions about a medical condition or this instruction, always ask your healthcare professional. [address] any warranty or liability for your use of this information.

## 2021-10-05 NOTE — PROGRESS NOTES
Shon Reveles is a 78 y.o. female. HPI/Chief C/O:  Chief Complaint   Patient presents with    Hypertension    Arthritis     To discuss medications for arthritis pain which causes insomnia    Discuss Medications     Questions about metoprolol.  Health Maintenance     Patient is agreeable to flu shot today. pended. Allergies   Allergen Reactions    Molds & Smuts Swelling    Meperidine Nausea And Vomiting   The patient is here for a medication list and treatment planning review  We will go over our care planning goals as well as take care of all refills  We will set up labs as well     Hypertension  This is a chronic problem. The current episode started more than 1 year ago. The problem is controlled. Associated symptoms include anxiety. Pertinent negatives include no blurred vision, chest pain, headaches, malaise/fatigue, neck pain, orthopnea, palpitations, peripheral edema, PND, shortness of breath or sweats. Risk factors for coronary artery disease include post-menopausal state and family history. Past treatments include lifestyle changes and beta blockers. The current treatment provides significant improvement. Compliance problems include exercise and diet. There is no history of angina, kidney disease, CAD/MI, CVA, heart failure, left ventricular hypertrophy, PVD or retinopathy. There is no history of chronic renal disease, coarctation of the aorta, hyperaldosteronism, hypercortisolism, hyperparathyroidism, a hypertension causing med, pheochromocytoma, renovascular disease, sleep apnea or a thyroid problem. ROS:  Review of Systems   Constitutional: Negative. Negative for activity change, appetite change, chills, diaphoresis, fatigue, fever, malaise/fatigue and unexpected weight change. HENT: Negative.   Negative for congestion, dental problem, drooling, ear discharge, ear pain, facial swelling, hearing loss, mouth sores, nosebleeds, postnasal drip, rhinorrhea, sinus pressure, sinus pain, sneezing, sore throat, tinnitus, trouble swallowing and voice change. Eyes: Negative for blurred vision, photophobia, pain, discharge, redness, itching and visual disturbance. Respiratory: Negative. Negative for apnea, cough, choking, chest tightness, shortness of breath, wheezing and stridor. Cardiovascular: Negative. Negative for chest pain, palpitations, orthopnea, leg swelling and PND. Gastrointestinal: Negative. Negative for abdominal distention, abdominal pain, anal bleeding, blood in stool, constipation, diarrhea, nausea, rectal pain and vomiting. Endocrine: Negative. Negative for cold intolerance, heat intolerance, polydipsia, polyphagia and polyuria. Genitourinary: Negative. Negative for decreased urine volume, difficulty urinating, dyspareunia, dysuria, enuresis, flank pain, frequency, genital sores, hematuria and urgency. Musculoskeletal: Negative for arthralgias, back pain, gait problem, joint swelling, myalgias, neck pain and neck stiffness. Skin: Negative. Negative for color change, pallor, rash and wound. Allergic/Immunologic: Negative. Negative for environmental allergies, food allergies and immunocompromised state. Neurological: Negative. Negative for dizziness, tremors, seizures, syncope, facial asymmetry, speech difficulty, weakness, light-headedness, numbness and headaches. Hematological: Negative. Negative for adenopathy. Does not bruise/bleed easily. Psychiatric/Behavioral: Positive for sleep disturbance. Negative for agitation, behavioral problems, confusion, decreased concentration, dysphoric mood, hallucinations, self-injury and suicidal ideas. The patient is nervous/anxious. The patient is not hyperactive.          Past Medical/Surgical Hx;  Reviewed with patient      Diagnosis Date    Asthma     GERD (gastroesophageal reflux disease)     Hernia     Hypertension      Past Surgical History:   Procedure Laterality Date    CARPAL TUNNEL RELEASE       SECTION      GASTRIC FUNDOPLICATION      HYSTERECTOMY, VAGINAL      LASIK      NASAL SEPTUM SURGERY         Past Family Hx:  Reviewed with patient      Problem Relation Age of Onset    Stroke Mother     Heart Disease Mother     Heart Surgery Mother     Heart Disease Father     Other Father     Arthritis Sister     Glaucoma Brother        Social Hx:  Reviewed with patient  Social History     Tobacco Use    Smoking status: Never Smoker    Smokeless tobacco: Never Used   Substance Use Topics    Alcohol use: No     Comment: Joey green tea       OBJECTIVE  /76   Pulse 64   Temp 97.9 °F (36.6 °C)   Resp 16   Ht 4' 11\" (1.499 m)   Wt 148 lb (67.1 kg)   LMP  (LMP Unknown)   SpO2 97%   Breastfeeding No   BMI 29.89 kg/m²     Problem List:  Eunice does not have any pertinent problems on file. PHYS EX:  Physical Exam  Vitals and nursing note reviewed. Constitutional:       General: She is not in acute distress. Appearance: Normal appearance. She is well-developed. She is not ill-appearing, toxic-appearing or diaphoretic. HENT:      Head: Normocephalic and atraumatic. Right Ear: External ear normal. There is no impacted cerumen. Left Ear: External ear normal. There is no impacted cerumen. Nose: Nose normal. No congestion or rhinorrhea. Mouth/Throat:      Mouth: Mucous membranes are moist.      Pharynx: No oropharyngeal exudate or posterior oropharyngeal erythema. Eyes:      General: No scleral icterus. Right eye: No discharge. Left eye: No discharge. Extraocular Movements: Extraocular movements intact. Conjunctiva/sclera: Conjunctivae normal.      Pupils: Pupils are equal, round, and reactive to light. Neck:      Thyroid: No thyromegaly. Vascular: No carotid bruit or JVD. Trachea: No tracheal deviation. Cardiovascular:      Rate and Rhythm: Normal rate and regular rhythm.       Pulses: Normal pulses. Heart sounds: Normal heart sounds. No murmur heard. No friction rub. No gallop. Pulmonary:      Effort: Pulmonary effort is normal. No respiratory distress. Breath sounds: Normal breath sounds. No stridor. No wheezing, rhonchi or rales. Chest:      Chest wall: No tenderness. Abdominal:      General: Bowel sounds are normal. There is no distension or abdominal bruit. Palpations: Abdomen is soft. Abdomen is not rigid. There is no shifting dullness, fluid wave, hepatomegaly, splenomegaly, mass or pulsatile mass. Tenderness: There is no abdominal tenderness. There is no right CVA tenderness, left CVA tenderness, guarding or rebound. Negative signs include Nguyen's sign and McBurney's sign. Hernia: No hernia is present. There is no hernia in the ventral area or left inguinal area. Musculoskeletal:         General: No swelling, tenderness, deformity or signs of injury. Cervical back: Normal range of motion and neck supple. No rigidity. No muscular tenderness. Right knee: No swelling, deformity, effusion, erythema, ecchymosis, lacerations or bony tenderness. Normal range of motion. No tenderness. No medial joint line, lateral joint line, MCL, LCL or patellar tendon tenderness. No LCL laxity or MCL laxity. Normal alignment, normal meniscus and normal patellar mobility. Right lower leg: No edema. Left lower leg: No edema. Lymphadenopathy:      Cervical: No cervical adenopathy. Skin:     General: Skin is warm. Coloration: Skin is not jaundiced or pale. Findings: No bruising, erythema, lesion or rash. Neurological:      General: No focal deficit present. Mental Status: She is alert and oriented to person, place, and time. Cranial Nerves: No cranial nerve deficit. Sensory: No sensory deficit. Motor: No weakness or abnormal muscle tone.       Coordination: Coordination normal.      Gait: Gait normal.      Deep Tendon Reflexes: beclomethasone (QVAR) 80 MCG/ACT inhaler Inhale 1 puff into the lungs 2 times daily (Patient taking differently: Inhale 1 puff into the lungs 2 times daily as needed ) 3 Inhaler 1    [DISCONTINUED] metoprolol succinate (TOPROL XL) 25 MG extended release tablet TAKE 1 TABLET BY MOUTH EVERY DAY 90 tablet 0     No facility-administered encounter medications on file as of 10/5/2021. No follow-ups on file.         Reviewed recent labs related to Eunice's current problems      Discussed importance of regular Health Maintenance follow up  Health Maintenance   Topic    Hepatitis C screen     Flu vaccine (1)    Breast cancer screen     Pneumococcal 65+ years Vaccine (1 of 1 - PPSV23)    Diabetes screen     Lipid screen     DTaP/Tdap/Td vaccine (2 - Td or Tdap)    Colon cancer screen colonoscopy     DEXA (modify frequency per FRAX score)     Shingles Vaccine     COVID-19 Vaccine     Hepatitis A vaccine     Hepatitis B vaccine     Hib vaccine     Meningococcal (ACWY) vaccine

## 2022-01-03 ENCOUNTER — HOSPITAL ENCOUNTER (OUTPATIENT)
Age: 67
Discharge: HOME | End: 2022-01-03
Payer: COMMERCIAL

## 2022-01-03 DIAGNOSIS — R73.01 IFG (IMPAIRED FASTING GLUCOSE): ICD-10-CM

## 2022-01-03 DIAGNOSIS — Z23 IMMUNIZATION DUE: ICD-10-CM

## 2022-01-03 DIAGNOSIS — R53.83 FATIGUE, UNSPECIFIED TYPE: ICD-10-CM

## 2022-01-03 DIAGNOSIS — E78.5 DYSLIPIDEMIA: ICD-10-CM

## 2022-01-03 DIAGNOSIS — I10 ESSENTIAL HYPERTENSION: ICD-10-CM

## 2022-01-03 LAB
ALBUMIN SERPL-MCNC: 4.2 G/DL (ref 3.5–5.2)
ALP BLD-CCNC: 73 U/L (ref 35–104)
ALT SERPL-CCNC: 13 U/L (ref 0–32)
ANION GAP SERPL CALCULATED.3IONS-SCNC: 10 MMOL/L (ref 7–16)
AST SERPL-CCNC: 13 U/L (ref 0–31)
BASOPHILS ABSOLUTE: 0.03 E9/L (ref 0–0.2)
BASOPHILS RELATIVE PERCENT: 0.7 % (ref 0–2)
BILIRUB SERPL-MCNC: 0.5 MG/DL (ref 0–1.2)
BUN BLDV-MCNC: 13 MG/DL (ref 6–23)
CALCIUM SERPL-MCNC: 9.5 MG/DL (ref 8.6–10.2)
CHLORIDE BLD-SCNC: 103 MMOL/L (ref 98–107)
CHOLESTEROL, TOTAL: 203 MG/DL (ref 0–199)
CO2: 26 MMOL/L (ref 22–29)
CREAT SERPL-MCNC: 0.7 MG/DL (ref 0.5–1)
EOSINOPHILS ABSOLUTE: 0.07 E9/L (ref 0.05–0.5)
EOSINOPHILS RELATIVE PERCENT: 1.6 % (ref 0–6)
GFR AFRICAN AMERICAN: >60
GFR NON-AFRICAN AMERICAN: >60 ML/MIN/1.73
GLUCOSE BLD-MCNC: 96 MG/DL (ref 74–99)
HBA1C MFR BLD: 5.2 % (ref 4–5.6)
HCT VFR BLD CALC: 43.7 % (ref 34–48)
HDLC SERPL-MCNC: 61 MG/DL
HEMOGLOBIN: 15.5 G/DL (ref 11.5–15.5)
IMMATURE GRANULOCYTES #: 0 E9/L
IMMATURE GRANULOCYTES %: 0 % (ref 0–5)
LDL CHOLESTEROL CALCULATED: 116 MG/DL (ref 0–99)
LYMPHOCYTES ABSOLUTE: 1.45 E9/L (ref 1.5–4)
LYMPHOCYTES RELATIVE PERCENT: 32.7 % (ref 20–42)
MCH RBC QN AUTO: 31.4 PG (ref 26–35)
MCHC RBC AUTO-ENTMCNC: 35.5 % (ref 32–34.5)
MCV RBC AUTO: 88.6 FL (ref 80–99.9)
MONOCYTES ABSOLUTE: 0.4 E9/L (ref 0.1–0.95)
MONOCYTES RELATIVE PERCENT: 9 % (ref 2–12)
NEUTROPHILS ABSOLUTE: 2.48 E9/L (ref 1.8–7.3)
NEUTROPHILS RELATIVE PERCENT: 56 % (ref 43–80)
PDW BLD-RTO: 12.3 FL (ref 11.5–15)
PLATELET # BLD: 199 E9/L (ref 130–450)
PMV BLD AUTO: 10.7 FL (ref 7–12)
POTASSIUM SERPL-SCNC: 4.3 MMOL/L (ref 3.5–5)
RBC # BLD: 4.93 E12/L (ref 3.5–5.5)
SODIUM BLD-SCNC: 139 MMOL/L (ref 132–146)
TOTAL PROTEIN: 7 G/DL (ref 6.4–8.3)
TRIGL SERPL-MCNC: 129 MG/DL (ref 0–149)
TSH SERPL DL<=0.05 MIU/L-ACNC: 1.49 UIU/ML (ref 0.27–4.2)
URIC ACID, SERUM: 4.7 MG/DL (ref 2.4–5.7)
VLDLC SERPL CALC-MCNC: 26 MG/DL
WBC # BLD: 4.4 E9/L (ref 4.5–11.5)

## 2022-01-03 PROCEDURE — 36415 COLL VENOUS BLD VENIPUNCTURE: CPT

## 2022-01-03 PROCEDURE — 80061 LIPID PANEL: CPT

## 2022-01-03 PROCEDURE — 85025 COMPLETE CBC W/AUTO DIFF WBC: CPT

## 2022-01-03 PROCEDURE — 84443 ASSAY THYROID STIM HORMONE: CPT

## 2022-01-03 PROCEDURE — 84550 ASSAY OF BLOOD/URIC ACID: CPT

## 2022-01-03 PROCEDURE — 80053 COMPREHEN METABOLIC PANEL: CPT

## 2022-01-03 PROCEDURE — 83036 HEMOGLOBIN GLYCOSYLATED A1C: CPT

## 2022-01-11 ENCOUNTER — TELEPHONE (OUTPATIENT)
Dept: FAMILY MEDICINE CLINIC | Age: 67
End: 2022-01-11

## 2022-01-13 ENCOUNTER — TELEPHONE (OUTPATIENT)
Dept: FAMILY MEDICINE CLINIC | Age: 67
End: 2022-01-13

## 2022-01-13 DIAGNOSIS — R05.9 COUGH: ICD-10-CM

## 2022-01-13 RX ORDER — ALBUTEROL SULFATE 90 UG/1
2 AEROSOL, METERED RESPIRATORY (INHALATION) EVERY 6 HOURS PRN
Qty: 3 EACH | Refills: 1 | Status: SHIPPED
Start: 2022-01-13 | End: 2022-08-10

## 2022-01-13 NOTE — TELEPHONE ENCOUNTER
This MA spoke with pt - pt advised of results and recommendations. Pt verbalized she understood.     Electronically signed by Rodolfo Zazueta MA on 1/13/22 at 11:00 AM EST

## 2022-01-13 NOTE — TELEPHONE ENCOUNTER
----- Message from Cody Kelley sent at 1/11/2022 11:51 AM EST -----  Subject: Refill Request    QUESTIONS  Name of Medication? albuterol sulfate HFA (VENTOLIN HFA) 108 (90 Base)   MCG/ACT inhaler  Patient-reported dosage and instructions? 2 PUFFS Q 6HRS PRN FOR WHEEZING  How many days do you have left? 0  Preferred Pharmacy? [id number] phone number (if available)? [phone]  ---------------------------------------------------------------------------  --------------  CALL BACK INFO  What is the best way for the office to contact you? OK to leave message on   voicemail  Preferred Call Back Phone Number?  [phone]

## 2022-01-13 NOTE — TELEPHONE ENCOUNTER
Pt called stating that she saw an ad for Dior Eckert and wanted to know if this is something you would be comfortable writing for her insomnia. Please advise.     Electronically signed by iMli Gardner MA on 1/13/22 at 11:14 AM EST

## 2022-03-16 DIAGNOSIS — I10 ESSENTIAL HYPERTENSION: ICD-10-CM

## 2022-03-16 RX ORDER — METOPROLOL SUCCINATE 25 MG/1
TABLET, EXTENDED RELEASE ORAL
Qty: 90 TABLET | Refills: 1 | Status: SHIPPED
Start: 2022-03-16 | End: 2022-08-10 | Stop reason: SDUPTHER

## 2022-05-19 ENCOUNTER — HOSPITAL ENCOUNTER (OUTPATIENT)
Dept: MAMMOGRAPHY | Age: 67
Discharge: HOME | End: 2022-05-21
Payer: COMMERCIAL

## 2022-05-19 VITALS — WEIGHT: 145 LBS | BODY MASS INDEX: 29.23 KG/M2 | HEIGHT: 59 IN

## 2022-05-19 DIAGNOSIS — Z12.31 ENCOUNTER FOR SCREENING MAMMOGRAM FOR MALIGNANT NEOPLASM OF BREAST: ICD-10-CM

## 2022-05-19 PROCEDURE — 77063 BREAST TOMOSYNTHESIS BI: CPT

## 2022-06-07 ENCOUNTER — OFFICE VISIT (OUTPATIENT)
Dept: FAMILY MEDICINE CLINIC | Age: 67
End: 2022-06-07
Payer: COMMERCIAL

## 2022-06-07 VITALS
BODY MASS INDEX: 29.64 KG/M2 | HEART RATE: 82 BPM | RESPIRATION RATE: 16 BRPM | WEIGHT: 147 LBS | HEIGHT: 59 IN | SYSTOLIC BLOOD PRESSURE: 128 MMHG | TEMPERATURE: 98 F | OXYGEN SATURATION: 94 % | DIASTOLIC BLOOD PRESSURE: 74 MMHG

## 2022-06-07 DIAGNOSIS — M54.30 SCIATICA, UNSPECIFIED LATERALITY: ICD-10-CM

## 2022-06-07 DIAGNOSIS — F51.01 PRIMARY INSOMNIA: ICD-10-CM

## 2022-06-07 DIAGNOSIS — I10 PRIMARY HYPERTENSION: ICD-10-CM

## 2022-06-07 DIAGNOSIS — M17.11 PRIMARY OSTEOARTHRITIS OF RIGHT KNEE: Primary | ICD-10-CM

## 2022-06-07 PROCEDURE — 99213 OFFICE O/P EST LOW 20 MIN: CPT | Performed by: FAMILY MEDICINE

## 2022-06-07 PROCEDURE — 1123F ACP DISCUSS/DSCN MKR DOCD: CPT | Performed by: FAMILY MEDICINE

## 2022-06-07 SDOH — ECONOMIC STABILITY: FOOD INSECURITY: WITHIN THE PAST 12 MONTHS, YOU WORRIED THAT YOUR FOOD WOULD RUN OUT BEFORE YOU GOT MONEY TO BUY MORE.: NEVER TRUE

## 2022-06-07 SDOH — ECONOMIC STABILITY: FOOD INSECURITY: WITHIN THE PAST 12 MONTHS, THE FOOD YOU BOUGHT JUST DIDN'T LAST AND YOU DIDN'T HAVE MONEY TO GET MORE.: NEVER TRUE

## 2022-06-07 ASSESSMENT — ENCOUNTER SYMPTOMS
EYE PAIN: 0
BLOOD IN STOOL: 0
VOICE CHANGE: 0
FACIAL SWELLING: 0
VOMITING: 0
APNEA: 0
CONSTIPATION: 0
EYE DISCHARGE: 0
PHOTOPHOBIA: 0
BLURRED VISION: 0
CHEST TIGHTNESS: 0
SHORTNESS OF BREATH: 0
EYE ITCHING: 0
SINUS PAIN: 0
BACK PAIN: 0
TROUBLE SWALLOWING: 0
RECTAL PAIN: 0
GASTROINTESTINAL NEGATIVE: 1
NAUSEA: 0
WHEEZING: 0
ANAL BLEEDING: 0
SORE THROAT: 0
ORTHOPNEA: 0
ALLERGIC/IMMUNOLOGIC NEGATIVE: 1
STRIDOR: 0
COLOR CHANGE: 0
SINUS PRESSURE: 0
ABDOMINAL DISTENTION: 0
ABDOMINAL PAIN: 0
RESPIRATORY NEGATIVE: 1
EYE REDNESS: 0
CHOKING: 0
COUGH: 0
DIARRHEA: 0
RHINORRHEA: 0

## 2022-06-07 ASSESSMENT — PATIENT HEALTH QUESTIONNAIRE - PHQ9
SUM OF ALL RESPONSES TO PHQ QUESTIONS 1-9: 0
1. LITTLE INTEREST OR PLEASURE IN DOING THINGS: 0
SUM OF ALL RESPONSES TO PHQ QUESTIONS 1-9: 0
SUM OF ALL RESPONSES TO PHQ QUESTIONS 1-9: 0
2. FEELING DOWN, DEPRESSED OR HOPELESS: 0
SUM OF ALL RESPONSES TO PHQ9 QUESTIONS 1 & 2: 0
SUM OF ALL RESPONSES TO PHQ QUESTIONS 1-9: 0

## 2022-06-07 ASSESSMENT — SOCIAL DETERMINANTS OF HEALTH (SDOH): HOW HARD IS IT FOR YOU TO PAY FOR THE VERY BASICS LIKE FOOD, HOUSING, MEDICAL CARE, AND HEATING?: NOT HARD AT ALL

## 2022-06-07 NOTE — PATIENT INSTRUCTIONS
Patient Education        High Blood Pressure: Care Instructions  Overview     It's normal for blood pressure to go up and down throughout the day. But if it stays up, you have high blood pressure. Another name for high blood pressure ishypertension. Despite what a lot of people think, high blood pressure usually doesn't cause headaches or make you feel dizzy or lightheaded. It usually has no symptoms. But it does increase your risk of stroke, heart attack, and other problems. You and your doctor will talk about your risks of these problems based on yourblood pressure. Your doctor will give you a goal for your blood pressure. Your goal will bebased on your health and your age. Lifestyle changes, such as eating healthy and being active, are always important to help lower blood pressure. You might also take medicine to reachyour blood pressure goal.  Follow-up care is a key part of your treatment and safety. Be sure to make and go to all appointments, and call your doctor if you are having problems. It's also a good idea to know your test results and keep alist of the medicines you take. How can you care for yourself at home? Medical treatment   If you stop taking your medicine, your blood pressure will go back up. You may take one or more types of medicine to lower your blood pressure. Be safe with medicines. Take your medicine exactly as prescribed. Call your doctor if you think you are having a problem with your medicine.  Talk to your doctor before you start taking aspirin every day. Aspirin can help certain people lower their risk of a heart attack or stroke. But taking aspirin isn't right for everyone, because it can cause serious bleeding.  See your doctor regularly. You may need to see the doctor more often at first or until your blood pressure comes down.  If you are taking blood pressure medicine, talk to your doctor before you take decongestants or anti-inflammatory medicine, such as ibuprofen. Some of these medicines can raise blood pressure.  Learn how to check your blood pressure at home. Lifestyle changes   Stay at a healthy weight. This is especially important if you put on weight around the waist. Losing even 10 pounds can help you lower your blood pressure.  If your doctor recommends it, get more exercise. Walking is a good choice. Bit by bit, increase the amount you walk every day. Try for at least 30 minutes on most days of the week. You also may want to swim, bike, or do other activities.  Avoid or limit alcohol. Talk to your doctor about whether you can drink any alcohol.  Try to limit how much sodium you eat to less than 2,300 milligrams (mg) a day. Your doctor may ask you to try to eat less than 1,500 mg a day.  Eat plenty of fruits (such as bananas and oranges), vegetables, legumes, whole grains, and low-fat dairy products.  Lower the amount of saturated fat in your diet. Saturated fat is found in animal products such as milk, cheese, and meat. Limiting these foods may help you lose weight and also lower your risk for heart disease.  Do not smoke. Smoking increases your risk for heart attack and stroke. If you need help quitting, talk to your doctor about stop-smoking programs and medicines. These can increase your chances of quitting for good. When should you call for help? Call 911  anytime you think you may need emergency care. This may mean having symptoms that suggest that your blood pressure is causing a serious heart or blood vessel problem. Your blood pressure may be over 180/120. For example, call 911 if:     You have symptoms of a heart attack. These may include:  ? Chest pain or pressure, or a strange feeling in the chest.  ? Sweating. ? Shortness of breath. ? Nausea or vomiting. ? Pain, pressure, or a strange feeling in the back, neck, jaw, or upper belly or in one or both shoulders or arms. ? Lightheadedness or sudden weakness.   ? A fast or irregular heartbeat.      You have symptoms of a stroke. These may include:  ? Sudden numbness, tingling, weakness, or loss of movement in your face, arm, or leg, especially on only one side of your body. ? Sudden vision changes. ? Sudden trouble speaking. ? Sudden confusion or trouble understanding simple statements. ? Sudden problems with walking or balance. ? A sudden, severe headache that is different from past headaches.      You have severe back or belly pain. Do not wait until your blood pressure comes down on its own. Get help right away. Call your doctor now or seek immediate care if:     Your blood pressure is much higher than normal (such as 180/120 or higher), but you don't have symptoms.      You think high blood pressure is causing symptoms, such as:  ? Severe headache.  ? Blurry vision. Watch closely for changes in your health, and be sure to contact your doctor if:     Your blood pressure measures higher than your doctor recommends at least 2 times. That means the top number is higher or the bottom number is higher, or both.      You think you may be having side effects from your blood pressure medicine. Where can you learn more? Go to https://XtimepeCam-Trax Technologies.Carweez. org and sign in to your [employer] account. Enter B331 in the [employer] box to learn more about \"High Blood Pressure: Care Instructions. \"     If you do not have an account, please click on the \"Sign Up Now\" link. Current as of: January 10, 2022               Content Version: 13.2  © 4383-6415 Healthwise, [facility]. Care instructions adapted under license by [facility] ([facility]). If you have questions about a medical condition or this instruction, always ask your healthcare professional. [place] [address] any warranty or liability for your use of this information.

## 2022-06-07 NOTE — PROGRESS NOTES
Mychal Givens is a 78 y.o. female. HPI/Chief C/O:  Chief Complaint   Patient presents with    Knee Pain     Pt is requesting handicap placard. pended. Allergies   Allergen Reactions    Molds & Smuts Swelling    Meperidine Nausea And Vomiting   The patient is here for a medication list and treatment planning review  We will go over our care planning goals as well as take care of all refills  We will set up labs as well   She is pre op [facility] for a new knee     Hypertension  This is a chronic problem. The current episode started more than 1 year ago. The problem is controlled. Associated symptoms include anxiety and malaise/fatigue. Pertinent negatives include no blurred vision, chest pain, headaches, neck pain, orthopnea, palpitations, peripheral edema, PND, shortness of breath or sweats. Risk factors for coronary artery disease include post-menopausal state and family history. Past treatments include lifestyle changes and beta blockers. The current treatment provides significant improvement. Compliance problems include exercise and diet. There is no history of angina, kidney disease, CAD/MI, CVA, heart failure, left ventricular hypertrophy, PVD or retinopathy. There is no history of chronic renal disease, coarctation of the aorta, hyperaldosteronism, hypercortisolism, hyperparathyroidism, a hypertension causing med, pheochromocytoma, renovascular disease, sleep apnea or a thyroid problem. ROS:  Review of Systems   Constitutional: Positive for malaise/fatigue. Negative for activity change, appetite change, chills, diaphoresis, fatigue, fever and unexpected weight change. HENT: Negative. Negative for congestion, dental problem, drooling, ear discharge, ear pain, facial swelling, hearing loss, mouth sores, nosebleeds, postnasal drip, rhinorrhea, sinus pressure, sinus pain, sneezing, sore throat, tinnitus, trouble swallowing and voice change.     Eyes: Negative for blurred vision, photophobia, pain, discharge, redness, itching and visual disturbance. Respiratory: Negative. Negative for apnea, cough, choking, chest tightness, shortness of breath, wheezing and stridor. Cardiovascular: Negative. Negative for chest pain, palpitations, orthopnea, leg swelling and PND. Gastrointestinal: Negative. Negative for abdominal distention, abdominal pain, anal bleeding, blood in stool, constipation, diarrhea, nausea, rectal pain and vomiting. Endocrine: Negative. Negative for cold intolerance, heat intolerance, polydipsia, polyphagia and polyuria. Genitourinary: Negative. Negative for decreased urine volume, difficulty urinating, dyspareunia, dysuria, enuresis, flank pain, frequency, genital sores, hematuria and urgency. Musculoskeletal: Positive for arthralgias (right knee). Negative for back pain, gait problem, joint swelling, myalgias, neck pain and neck stiffness. Skin: Negative. Negative for color change, pallor, rash and wound. Allergic/Immunologic: Negative. Negative for environmental allergies, food allergies and immunocompromised state. Neurological: Negative. Negative for dizziness, tremors, seizures, syncope, facial asymmetry, speech difficulty, weakness, light-headedness and headaches. Hematological: Negative. Negative for adenopathy. Does not bruise/bleed easily. Psychiatric/Behavioral: Positive for sleep disturbance. Negative for agitation, behavioral problems, confusion, decreased concentration, dysphoric mood, hallucinations, self-injury and suicidal ideas. The patient is nervous/anxious. The patient is not hyperactive.          Past Medical/Surgical Hx;  Reviewed with patient      Diagnosis Date    Asthma     GERD (gastroesophageal reflux disease)     Hernia     Hypertension      Past Surgical History:   Procedure Laterality Date    CARPAL TUNNEL RELEASE       SECTION      GASTRIC FUNDOPLICATION      HYSTERECTOMY      HYSTERECTOMY, VAGINAL  LASIK      NASAL SEPTUM SURGERY         Past Family Hx:  Reviewed with patient      Problem Relation Age of Onset    Stroke Mother     Heart Disease Mother     Heart Surgery Mother     Heart Disease Father     Other Father     Prostate Cancer Father     Arthritis Sister     Glaucoma Brother        Social Hx:  Reviewed with patient  Social History     Tobacco Use    Smoking status: Never Smoker    Smokeless tobacco: Never Used   Substance Use Topics    Alcohol use: No     Comment: Joey green tea       OBJECTIVE  /74   Pulse 82   Temp 98 °F (36.7 °C)   Resp 16   Ht 4' 11\" (1.499 m)   Wt 147 lb (66.7 kg)   LMP  (LMP Unknown)   SpO2 94%   Breastfeeding No   BMI 29.69 kg/m²     Problem List:  Eunice does not have any pertinent problems on file. PHYS EX:  Physical Exam  Vitals and nursing note reviewed. Constitutional:       General: She is not in acute distress. Appearance: Normal appearance. She is well-developed. She is not ill-appearing, toxic-appearing or diaphoretic. HENT:      Head: Normocephalic and atraumatic. Right Ear: External ear normal. There is no impacted cerumen. Left Ear: External ear normal. There is no impacted cerumen. Nose: Nose normal. No congestion or rhinorrhea. Mouth/Throat:      Mouth: Mucous membranes are moist.      Pharynx: No oropharyngeal exudate or posterior oropharyngeal erythema. Eyes:      General: No scleral icterus. Right eye: No discharge. Left eye: No discharge. Extraocular Movements: Extraocular movements intact. Conjunctiva/sclera: Conjunctivae normal.      Pupils: Pupils are equal, round, and reactive to light. Neck:      Thyroid: No thyromegaly. Vascular: No carotid bruit or JVD. Trachea: No tracheal deviation. Cardiovascular:      Rate and Rhythm: Normal rate and regular rhythm. Pulses: Normal pulses. Heart sounds: Normal heart sounds. No murmur heard.   No friction rub. No gallop. Pulmonary:      Effort: Pulmonary effort is normal. No respiratory distress. Breath sounds: Normal breath sounds. No stridor. No wheezing, rhonchi or rales. Chest:      Chest wall: No tenderness. Abdominal:      General: Bowel sounds are normal. There is no distension or abdominal bruit. Palpations: Abdomen is soft. Abdomen is not rigid. There is no shifting dullness, fluid wave, hepatomegaly, splenomegaly, mass or pulsatile mass. Tenderness: There is no abdominal tenderness. There is no right CVA tenderness, left CVA tenderness, guarding or rebound. Negative signs include Nguyen's sign and McBurney's sign. Hernia: No hernia is present. There is no hernia in the ventral area or left inguinal area. Musculoskeletal:         General: Tenderness (right knee pain) present. No swelling, deformity or signs of injury. Cervical back: Normal range of motion and neck supple. No rigidity. No muscular tenderness. Right knee: No swelling, deformity, effusion, erythema, ecchymosis, lacerations or bony tenderness. Normal range of motion. No tenderness. No medial joint line, lateral joint line, MCL, LCL or patellar tendon tenderness. No LCL laxity or MCL laxity. Normal alignment, normal meniscus and normal patellar mobility. Right lower leg: No edema. Left lower leg: No edema. Lymphadenopathy:      Cervical: No cervical adenopathy. Skin:     General: Skin is warm. Coloration: Skin is not jaundiced or pale. Findings: No bruising, erythema, lesion or rash. Neurological:      General: No focal deficit present. Mental Status: She is alert and oriented to person, place, and time. Cranial Nerves: No cranial nerve deficit. Sensory: No sensory deficit. Motor: No weakness or abnormal muscle tone. Coordination: Coordination normal.      Gait: Gait normal.      Deep Tendon Reflexes: Reflexes are normal and symmetric.  Reflexes normal. ASSESSMENT/PLAN  Eunice was seen today for knee pain. Diagnoses and all orders for this visit:    Primary osteoarthritis of right knee  -     Handicap Placard MISC; It is my medical opinion that [address] requires a disability parking placard for the following reasons:  She has limited walking ability due to an orthopedic condition. Duration of need: 5 years  --PLAN--inject right knee  x 2                1/2 cc xylocaine plus 1 cc depo medrol                Omt/ultra--Rx        Sciatica, unspecified laterality  --OMT / ultra --Rx    Primary hypertension ---controlled   --patient is instructed on low to moderate sodium ( 2 to 2.5 grams ), daily    Also to increase potassium in the diet to about 3.5 grams daily    Literature is provided       Primary insomnia  Long talk on treatment and prevention  Literature is given           Outpatient Encounter Medications as of 6/7/2022   Medication Sig Dispense Refill    Handicap Placard [facility] It is my medical opinion that [address] requires a disability parking placard for the following reasons:  She has limited walking ability due to an orthopedic condition. Duration of need: 5 years 1 each 0    metoprolol succinate (TOPROL XL) 25 MG extended release tablet TAKE 1 TABLET DAILY 90 tablet 1    albuterol sulfate HFA (VENTOLIN HFA) 108 (90 Base) MCG/ACT inhaler Inhale 2 puffs into the lungs every 6 hours as needed for Wheezing 3 each 1    diphenhydrAMINE-APAP, sleep, (TYLENOL PM EXTRA STRENGTH PO) Take 2 tablets by mouth nightly      promethazine (PHENERGAN) 25 MG tablet Take 1 tablet by mouth daily as needed for Nausea 90 tablet 0    beclomethasone (QVAR) 80 MCG/ACT inhaler Inhale 1 puff into the lungs 2 times daily (Patient taking differently: Inhale 1 puff into the lungs 2 times daily as needed ) 3 Inhaler 1     No facility-administered encounter medications on file as of 6/7/2022. No follow-ups on file.         Reviewed recent labs related to Maycodnharjinder's current problems      Discussed importance of regular Health Maintenance follow up  Health Maintenance   Topic    Hepatitis C screen     Pneumococcal 65+ years Vaccine (2 - PCV)    Depression Screen     Breast cancer screen     Lipids     DTaP/Tdap/Td vaccine (2 - Td or Tdap)    Colorectal Cancer Screen     DEXA (modify frequency per FRAX score)     Flu vaccine     Shingles vaccine     COVID-19 Vaccine     Hepatitis A vaccine     Hepatitis B vaccine     Hib vaccine     Meningococcal (ACWY) vaccine

## 2022-07-08 ENCOUNTER — OFFICE VISIT (OUTPATIENT)
Dept: FAMILY MEDICINE CLINIC | Age: 67
End: 2022-07-08
Payer: COMMERCIAL

## 2022-07-08 VITALS
BODY MASS INDEX: 29.43 KG/M2 | WEIGHT: 146 LBS | HEART RATE: 81 BPM | OXYGEN SATURATION: 96 % | SYSTOLIC BLOOD PRESSURE: 100 MMHG | DIASTOLIC BLOOD PRESSURE: 70 MMHG | RESPIRATION RATE: 18 BRPM | TEMPERATURE: 98.4 F | HEIGHT: 59 IN

## 2022-07-08 DIAGNOSIS — M17.11 PRIMARY OSTEOARTHRITIS OF RIGHT KNEE: ICD-10-CM

## 2022-07-08 DIAGNOSIS — E66.3 OVERWEIGHT (BMI 25.0-29.9): ICD-10-CM

## 2022-07-08 DIAGNOSIS — I10 PRIMARY HYPERTENSION: Primary | ICD-10-CM

## 2022-07-08 LAB
BILIRUBIN, POC: NORMAL
BLOOD URINE, POC: NORMAL
CLARITY, POC: CLEAR
COLOR, POC: YELLOW
GLUCOSE URINE, POC: NORMAL
KETONES, POC: NORMAL
LEUKOCYTE EST, POC: NORMAL
NITRITE, POC: NORMAL
PH, POC: 5.5
PROTEIN, POC: NORMAL
SPECIFIC GRAVITY, POC: 1.02
UROBILINOGEN, POC: 0.2

## 2022-07-08 PROCEDURE — 1123F ACP DISCUSS/DSCN MKR DOCD: CPT | Performed by: FAMILY MEDICINE

## 2022-07-08 PROCEDURE — 99213 OFFICE O/P EST LOW 20 MIN: CPT | Performed by: FAMILY MEDICINE

## 2022-07-08 PROCEDURE — 81002 URINALYSIS NONAUTO W/O SCOPE: CPT | Performed by: FAMILY MEDICINE

## 2022-07-08 RX ORDER — NITROFURANTOIN 25; 75 MG/1; MG/1
CAPSULE ORAL
COMMUNITY
Start: 2022-06-28 | End: 2022-08-10

## 2022-07-08 ASSESSMENT — ENCOUNTER SYMPTOMS
SINUS PAIN: 0
TROUBLE SWALLOWING: 0
STRIDOR: 0
BLOOD IN STOOL: 0
CONSTIPATION: 0
RHINORRHEA: 0
ABDOMINAL DISTENTION: 0
WHEEZING: 0
FACIAL SWELLING: 0
SORE THROAT: 0
PHOTOPHOBIA: 0
SHORTNESS OF BREATH: 0
ALLERGIC/IMMUNOLOGIC NEGATIVE: 1
GASTROINTESTINAL NEGATIVE: 1
CHOKING: 0
RESPIRATORY NEGATIVE: 1
EYE REDNESS: 0
EYE DISCHARGE: 0
DIARRHEA: 0
BLURRED VISION: 0
ABDOMINAL PAIN: 0
BACK PAIN: 0
NAUSEA: 0
RECTAL PAIN: 0
ANAL BLEEDING: 0
COLOR CHANGE: 0
CHEST TIGHTNESS: 0
EYE ITCHING: 0
SINUS PRESSURE: 0
APNEA: 0
EYE PAIN: 0
COUGH: 0
VOICE CHANGE: 0
ORTHOPNEA: 0
VOMITING: 0

## 2022-07-08 NOTE — PATIENT INSTRUCTIONS

## 2022-07-08 NOTE — PROGRESS NOTES
Javier Chen is a 75 y.o. female. HPI/Chief C/O:  Chief Complaint   Patient presents with    Follow-up     Pt is here for a follow up on her bladder infection. States she is here for clearance for surgery on 7/26/22 for a right TKA. Allergies   Allergen Reactions    Molds & Smuts Swelling    Meperidine Nausea And Vomiting   The patient is here for a medication list and treatment planning review  We will go over our care planning goals as well as take care of all refills  We will set up labs as well   She is per op right knee, again     Hypertension  This is a chronic problem. The current episode started more than 1 year ago. The problem is controlled. Associated symptoms include anxiety and malaise/fatigue. Pertinent negatives include no blurred vision, chest pain, headaches, neck pain, orthopnea, palpitations, peripheral edema, PND, shortness of breath or sweats. Risk factors for coronary artery disease include post-menopausal state and family history. Past treatments include lifestyle changes and beta blockers. The current treatment provides significant improvement. Compliance problems include exercise and diet. There is no history of angina, kidney disease, CAD/MI, CVA, heart failure, left ventricular hypertrophy, PVD or retinopathy. There is no history of chronic renal disease, coarctation of the aorta, hyperaldosteronism, hypercortisolism, hyperparathyroidism, a hypertension causing med, pheochromocytoma, renovascular disease, sleep apnea or a thyroid problem. ROS:  Review of Systems   Constitutional: Positive for malaise/fatigue. Negative for activity change, appetite change, chills, diaphoresis, fatigue, fever and unexpected weight change. HENT: Negative.   Negative for congestion, dental problem, drooling, ear discharge, ear pain, facial swelling, hearing loss, mouth sores, nosebleeds, postnasal drip, rhinorrhea, sinus pressure, sinus pain, sneezing, sore throat, tinnitus, trouble swallowing and voice change. Eyes: Negative for blurred vision, photophobia, pain, discharge, redness, itching and visual disturbance. Respiratory: Negative. Negative for apnea, cough, choking, chest tightness, shortness of breath, wheezing and stridor. Cardiovascular: Negative. Negative for chest pain, palpitations, orthopnea, leg swelling and PND. Gastrointestinal: Negative. Negative for abdominal distention, abdominal pain, anal bleeding, blood in stool, constipation, diarrhea, nausea, rectal pain and vomiting. Endocrine: Negative. Negative for cold intolerance, heat intolerance, polydipsia, polyphagia and polyuria. Genitourinary: Negative. Negative for decreased urine volume, difficulty urinating, dyspareunia, dysuria, enuresis, flank pain, frequency, genital sores, hematuria and urgency. Musculoskeletal: Positive for arthralgias (right knee). Negative for back pain, gait problem, joint swelling, myalgias, neck pain and neck stiffness. Skin: Negative. Negative for color change, pallor, rash and wound. Allergic/Immunologic: Negative. Negative for environmental allergies, food allergies and immunocompromised state. Neurological: Negative. Negative for dizziness, tremors, seizures, syncope, facial asymmetry, speech difficulty, weakness, light-headedness and headaches. Hematological: Negative. Negative for adenopathy. Does not bruise/bleed easily. Psychiatric/Behavioral: Positive for sleep disturbance. Negative for agitation, behavioral problems, confusion, decreased concentration, dysphoric mood, hallucinations, self-injury and suicidal ideas. The patient is nervous/anxious. The patient is not hyperactive.          Past Medical/Surgical Hx;  Reviewed with patient      Diagnosis Date    Asthma     GERD (gastroesophageal reflux disease)     Hernia     Hypertension      Past Surgical History:   Procedure Laterality Date    CARPAL TUNNEL RELEASE       SECTION      GASTRIC FUNDOPLICATION      HYSTERECTOMY (CERVIX STATUS UNKNOWN)      HYSTERECTOMY, VAGINAL      LASIK      NASAL SEPTUM SURGERY         Past Family Hx:  Reviewed with patient      Problem Relation Age of Onset    Stroke Mother     Heart Disease Mother     Heart Surgery Mother     Heart Disease Father     Other Father     Prostate Cancer Father     Arthritis Sister     Glaucoma Brother        Social Hx:  Reviewed with patient  Social History     Tobacco Use    Smoking status: Never Smoker    Smokeless tobacco: Never Used   Substance Use Topics    Alcohol use: No     Comment: Joey green tea       OBJECTIVE  /70   Pulse 81   Temp 98.4 °F (36.9 °C) (Temporal)   Resp 18   Ht 4' 11\" (1.499 m)   Wt 146 lb (66.2 kg)   LMP  (LMP Unknown)   SpO2 96%   Breastfeeding No   BMI 29.49 kg/m²     Problem List:  Eunice does not have any pertinent problems on file. PHYS EX:  Physical Exam  Vitals and nursing note reviewed. Constitutional:       General: She is not in acute distress. Appearance: Normal appearance. She is well-developed. She is not ill-appearing, toxic-appearing or diaphoretic. HENT:      Head: Normocephalic and atraumatic. Right Ear: External ear normal.      Left Ear: External ear normal.      Nose: Nose normal. No congestion or rhinorrhea. Mouth/Throat:      Mouth: Mucous membranes are moist.      Pharynx: No oropharyngeal exudate or posterior oropharyngeal erythema. Eyes:      General: No scleral icterus. Right eye: No discharge. Left eye: No discharge. Extraocular Movements: Extraocular movements intact. Conjunctiva/sclera: Conjunctivae normal.      Pupils: Pupils are equal, round, and reactive to light. Neck:      Thyroid: No thyromegaly. Vascular: No carotid bruit or JVD. Trachea: No tracheal deviation. Cardiovascular:      Rate and Rhythm: Normal rate and regular rhythm. Pulses: Normal pulses. Heart sounds: Normal heart sounds. No murmur heard. No friction rub. No gallop. Pulmonary:      Effort: Pulmonary effort is normal. No respiratory distress. Breath sounds: Normal breath sounds. No stridor. No wheezing, rhonchi or rales. Chest:      Chest wall: No tenderness. Abdominal:      General: Bowel sounds are normal. There is no distension or abdominal bruit. Palpations: Abdomen is soft. Abdomen is not rigid. There is no shifting dullness, fluid wave, hepatomegaly, splenomegaly, mass or pulsatile mass. Tenderness: There is no abdominal tenderness. There is no right CVA tenderness, left CVA tenderness, guarding or rebound. Negative signs include Nguyen's sign and McBurney's sign. Hernia: No hernia is present. There is no hernia in the ventral area or left inguinal area. Musculoskeletal:         General: Tenderness (right knee pain) present. No swelling, deformity or signs of injury. Cervical back: Normal range of motion and neck supple. No rigidity. No muscular tenderness. Right knee: No swelling, deformity, effusion, erythema, ecchymosis, lacerations or bony tenderness. Normal range of motion. No tenderness. No medial joint line, lateral joint line, MCL, LCL or patellar tendon tenderness. No LCL laxity or MCL laxity. Normal alignment, normal meniscus and normal patellar mobility. Right lower leg: No edema. Left lower leg: No edema. Lymphadenopathy:      Cervical: No cervical adenopathy. Skin:     General: Skin is warm. Coloration: Skin is not jaundiced or pale. Findings: No bruising, erythema, lesion or rash. Neurological:      General: No focal deficit present. Mental Status: She is alert and oriented to person, place, and time. Cranial Nerves: No cranial nerve deficit. Sensory: No sensory deficit. Motor: No weakness or abnormal muscle tone.       Coordination: Coordination normal.      Gait: Gait normal.      Deep Tendon Reflexes: Reflexes are normal and symmetric. Reflexes normal.         ASSESSMENT/PLAN  Eunice was seen today for follow-up. Diagnoses and all orders for this visit:    Primary hypertension ---controlled   --patient is instructed on low to moderate sodium ( 2 to 2.5 grams ), daily    Also to increase potassium in the diet to about 3.5 grams daily    Literature is provided       Primary osteoarthritis of right knee  --she is pre op  --she is cleared for surgery     Overweight (BMI 25.0-29.9)  --talk diet and weight loss        Outpatient Encounter Medications as of 7/8/2022   Medication Sig Dispense Refill    [place] [facility] [place] medical opinion that [address] requires a disability parking placard for the following reasons:  She has limited walking ability due to an orthopedic condition. Duration of need: 5 years 1 each 0    metoprolol succinate (TOPROL XL) 25 MG extended release tablet TAKE 1 TABLET DAILY 90 tablet 1    albuterol sulfate HFA (VENTOLIN HFA) 108 (90 Base) MCG/ACT inhaler Inhale 2 puffs into the lungs every 6 hours as needed for Wheezing 3 each 1    diphenhydrAMINE-APAP, sleep, (TYLENOL PM EXTRA STRENGTH PO) Take 2 tablets by mouth nightly      promethazine (PHENERGAN) 25 MG tablet Take 1 tablet by mouth daily as needed for Nausea 90 tablet 0    beclomethasone (QVAR) 80 MCG/ACT inhaler Inhale 1 puff into the lungs 2 times daily (Patient taking differently: Inhale 1 puff into the lungs 2 times daily as needed ) 3 Inhaler 1    nitrofurantoin, macrocrystal-monohydrate, (MACROBID) 100 MG capsule  (Patient not taking: Reported on 7/8/2022)       No facility-administered encounter medications on file as of 7/8/2022. Return in about 6 weeks (around 8/19/2022).         Reviewed recent labs related to Eunice's current problems      Discussed importance of regular Health Maintenance follow up  Health Maintenance   Topic    Hepatitis C screen     Pneumococcal 65+ years Vaccine (2 - PCV)    Flu vaccine (1)    Depression Screen     Breast cancer screen     Lipids     DTaP/Tdap/Td vaccine (2 - Td or Tdap)    Colorectal Cancer Screen     DEXA (modify frequency per FRAX score)     Shingles vaccine     COVID-19 Vaccine     Hepatitis A vaccine     Hepatitis B vaccine     Hib vaccine     Meningococcal (ACWY) vaccine

## 2022-08-05 ENCOUNTER — PATIENT MESSAGE (OUTPATIENT)
Dept: FAMILY MEDICINE CLINIC | Age: 67
End: 2022-08-05

## 2022-08-05 NOTE — TELEPHONE ENCOUNTER
From: Eunice Sutherland  To: Dr. Malachi Oliveira: 8/5/2022 12:47 PM EDT  Subject: Blood pressure med    Would appreciate the office answering their phone. Have tried to call all morning. Even transferred from [facility] switchboard. Have a question about Metoprolol. My BP has been high since discharged from [address] [place] [address]. I thought my original BP med was at 50mg. The one i have currently states 25mg. Need to speak to someone about which is correct and does this need revisited. Recovering from TKR done on July 26.

## 2022-08-05 NOTE — TELEPHONE ENCOUNTER
This MA returned phone call to pt. Pt states that she had a total knee replacement at the  on 7/26 and has been experienced elevated blood pressures since then. Pt reports systolic pressures have been in the 130-140's, has been keeping a log at home when checking. Pt scheduled 8/10 at 2:00pm for office visit, advised to continue to monitor blood pressure and bring log to visit.      Electronically signed by Mariam Kelly MA on 8/5/22 at 3:53 PM EDT

## 2022-08-10 ENCOUNTER — TELEPHONE (OUTPATIENT)
Dept: FAMILY MEDICINE CLINIC | Age: 67
End: 2022-08-10

## 2022-08-10 ENCOUNTER — OFFICE VISIT (OUTPATIENT)
Dept: FAMILY MEDICINE CLINIC | Age: 67
End: 2022-08-10
Payer: COMMERCIAL

## 2022-08-10 VITALS
BODY MASS INDEX: 29.43 KG/M2 | HEART RATE: 113 BPM | TEMPERATURE: 98.4 F | RESPIRATION RATE: 18 BRPM | DIASTOLIC BLOOD PRESSURE: 104 MMHG | OXYGEN SATURATION: 96 % | SYSTOLIC BLOOD PRESSURE: 130 MMHG | HEIGHT: 59 IN | WEIGHT: 146 LBS

## 2022-08-10 DIAGNOSIS — I26.99 PULMONARY EMBOLISM, OTHER, UNSPECIFIED CHRONICITY, UNSPECIFIED WHETHER ACUTE COR PULMONALE PRESENT (HCC): ICD-10-CM

## 2022-08-10 DIAGNOSIS — E66.3 OVERWEIGHT (BMI 25.0-29.9): ICD-10-CM

## 2022-08-10 DIAGNOSIS — I10 PRIMARY HYPERTENSION: Primary | ICD-10-CM

## 2022-08-10 DIAGNOSIS — Z96.651 HISTORY OF TOTAL RIGHT KNEE REPLACEMENT: ICD-10-CM

## 2022-08-10 DIAGNOSIS — M79.89 LEG SWELLING: ICD-10-CM

## 2022-08-10 DIAGNOSIS — I10 ESSENTIAL HYPERTENSION: ICD-10-CM

## 2022-08-10 PROCEDURE — 99213 OFFICE O/P EST LOW 20 MIN: CPT | Performed by: FAMILY MEDICINE

## 2022-08-10 PROCEDURE — 1123F ACP DISCUSS/DSCN MKR DOCD: CPT | Performed by: FAMILY MEDICINE

## 2022-08-10 RX ORDER — METOPROLOL SUCCINATE 25 MG/1
TABLET, EXTENDED RELEASE ORAL
Qty: 180 TABLET | Refills: 1 | Status: SHIPPED
Start: 2022-08-10 | End: 2022-09-12

## 2022-08-10 RX ORDER — MELOXICAM 15 MG/1
TABLET ORAL
COMMUNITY
Start: 2022-07-26 | End: 2022-08-10

## 2022-08-10 ASSESSMENT — ENCOUNTER SYMPTOMS
EYE DISCHARGE: 0
RHINORRHEA: 0
CONSTIPATION: 0
TROUBLE SWALLOWING: 0
ALLERGIC/IMMUNOLOGIC NEGATIVE: 1
RECTAL PAIN: 0
SINUS PAIN: 0
COUGH: 0
EYE ITCHING: 0
GASTROINTESTINAL NEGATIVE: 1
COLOR CHANGE: 0
BLURRED VISION: 0
ANAL BLEEDING: 0
SINUS PRESSURE: 0
ABDOMINAL PAIN: 0
APNEA: 0
VOICE CHANGE: 0
DIARRHEA: 0
SORE THROAT: 0
BACK PAIN: 0
RESPIRATORY NEGATIVE: 1
NAUSEA: 0
SHORTNESS OF BREATH: 0
PHOTOPHOBIA: 0
STRIDOR: 0
BLOOD IN STOOL: 0
ABDOMINAL DISTENTION: 0
CHOKING: 0
WHEEZING: 0
EYE PAIN: 0
EYE REDNESS: 0
CHEST TIGHTNESS: 0
FACIAL SWELLING: 0
VOMITING: 0
ORTHOPNEA: 0

## 2022-08-10 NOTE — TELEPHONE ENCOUNTER
This MA contacted pt - pt advised of orders for US and CTA to r/o blood clot. Pt verbalized she understood and is amendable. Pt provided with Central Scheduling contact information and advised to attempt to schedule as soon as possible. Pt advised if she develops chest pain or shortness of breath to present to ED ASAP. Pt verbalized she understood and is amendable.     Electronically signed by Carol Curiel MA on 8/10/22 at 3:46 PM EDT

## 2022-08-10 NOTE — PROGRESS NOTES
Moon Preston is a 75 y.o. female. HPI/Chief C/O:  Chief Complaint   Patient presents with    Hypertension     Pt here for a follow up on her blood pressure. Allergies   Allergen Reactions    Molds & Smuts Swelling    Meperidine Nausea And Vomiting   The patient is here for a medication list and treatment planning review  We will go over our care planning goals as well as take care of all refills  We will set up labs as well     Her BP and pulse have been up post op right knee at [facility]    Hypertension  This is a chronic problem. The current episode started more than 1 year ago. The problem is uncontrolled. Associated symptoms include anxiety and malaise/fatigue. Pertinent negatives include no blurred vision, chest pain, headaches, neck pain, orthopnea, palpitations, peripheral edema, PND, shortness of breath or sweats. Risk factors for coronary artery disease include post-menopausal state and family history. Past treatments include lifestyle changes and beta blockers. The current treatment provides significant improvement. Compliance problems include exercise and diet. There is no history of angina, kidney disease, CAD/MI, CVA, heart failure, left ventricular hypertrophy, PVD or retinopathy. There is no history of chronic renal disease, coarctation of the aorta, hyperaldosteronism, hypercortisolism, hyperparathyroidism, a hypertension causing med, pheochromocytoma, renovascular disease, sleep apnea or a thyroid problem. ROS:  Review of Systems   Constitutional:  Positive for malaise/fatigue. Negative for activity change, appetite change, chills, diaphoresis, fatigue, fever and unexpected weight change. HENT: Negative. Negative for congestion, dental problem, drooling, ear discharge, ear pain, facial swelling, hearing loss, mouth sores, nosebleeds, postnasal drip, rhinorrhea, sinus pressure, sinus pain, sneezing, sore throat, tinnitus, trouble swallowing and voice change.     Eyes: Negative for blurred vision, photophobia, pain, discharge, redness, itching and visual disturbance. Respiratory: Negative. Negative for apnea, cough, choking, chest tightness, shortness of breath, wheezing and stridor. Cardiovascular: Negative. Negative for chest pain, palpitations, orthopnea, leg swelling and PND. Gastrointestinal: Negative. Negative for abdominal distention, abdominal pain, anal bleeding, blood in stool, constipation, diarrhea, nausea, rectal pain and vomiting. Endocrine: Negative. Negative for cold intolerance, heat intolerance, polydipsia, polyphagia and polyuria. Genitourinary: Negative. Negative for decreased urine volume, difficulty urinating, dyspareunia, dysuria, enuresis, flank pain, frequency, genital sores, hematuria and urgency. Musculoskeletal:  Positive for arthralgias (right knee is post op). Negative for back pain, gait problem, joint swelling, myalgias, neck pain and neck stiffness. Skin: Negative. Negative for color change, pallor, rash and wound. Allergic/Immunologic: Negative. Negative for environmental allergies, food allergies and immunocompromised state. Neurological: Negative. Negative for dizziness, tremors, seizures, syncope, facial asymmetry, speech difficulty, weakness, light-headedness and headaches. Hematological: Negative. Negative for adenopathy. Does not bruise/bleed easily. Psychiatric/Behavioral:  Positive for sleep disturbance. Negative for agitation, behavioral problems, confusion, decreased concentration, dysphoric mood, hallucinations, self-injury and suicidal ideas. The patient is nervous/anxious. The patient is not hyperactive.        Past Medical/Surgical Hx;  Reviewed with patient      Diagnosis Date    Asthma     GERD (gastroesophageal reflux disease)     Hernia     Hypertension      Past Surgical History:   Procedure Laterality Date    CARPAL TUNNEL RELEASE       SECTION      GASTRIC FUNDOPLICATION      HYSTERECTOMY (CERVIX STATUS UNKNOWN)      HYSTERECTOMY, VAGINAL      LASIK      NASAL SEPTUM SURGERY         Past Family Hx:  Reviewed with patient      Problem Relation Age of Onset    Stroke Mother     Heart Disease Mother     Heart Surgery Mother     Heart Disease Father     Other Father     Prostate Cancer Father     Arthritis Sister     Glaucoma Brother        Social Hx:  Reviewed with patient  Social History     Tobacco Use    Smoking status: Never    Smokeless tobacco: Never   Substance Use Topics    Alcohol use: No     Comment: Joey green tea       OBJECTIVE  BP (!) 130/104   Pulse (!) 113   Temp 98.4 °F (36.9 °C) (Temporal)   Resp 18   Ht 4' 11\" (1.499 m)   Wt 146 lb (66.2 kg)   LMP  (LMP Unknown)   SpO2 96%   Breastfeeding No   BMI 29.49 kg/m²     Problem List:  Eunice does not have any pertinent problems on file. PHYS EX:  Physical Exam  Vitals and nursing note reviewed. Constitutional:       General: She is not in acute distress. Appearance: Normal appearance. She is well-developed. She is not ill-appearing, toxic-appearing or diaphoretic. HENT:      Head: Normocephalic and atraumatic. Right Ear: External ear normal.      Left Ear: External ear normal.      Nose: Nose normal. No congestion or rhinorrhea. Mouth/Throat:      Mouth: Mucous membranes are moist.      Pharynx: No oropharyngeal exudate or posterior oropharyngeal erythema. Eyes:      General: No scleral icterus. Right eye: No discharge. Left eye: No discharge. Extraocular Movements: Extraocular movements intact. Conjunctiva/sclera: Conjunctivae normal.      Pupils: Pupils are equal, round, and reactive to light. Neck:      Thyroid: No thyromegaly. Vascular: No carotid bruit or JVD. Trachea: No tracheal deviation. Cardiovascular:      Rate and Rhythm: Regular rhythm. Tachycardia present. Pulses: Normal pulses. Heart sounds: Normal heart sounds. Heart sounds not distant.  No murmur heard. No systolic murmur is present. No diastolic murmur is present. No friction rub. No gallop. Pulmonary:      Effort: Pulmonary effort is normal. No respiratory distress. Breath sounds: Normal breath sounds. No stridor. No wheezing, rhonchi or rales. Chest:      Chest wall: No tenderness. Abdominal:      General: Bowel sounds are normal. There is no distension or abdominal bruit. Palpations: Abdomen is soft. Abdomen is not rigid. There is no shifting dullness, fluid wave, hepatomegaly, splenomegaly, mass or pulsatile mass. Tenderness: There is no abdominal tenderness. There is no right CVA tenderness, left CVA tenderness, guarding or rebound. Negative signs include Nguyen's sign and McBurney's sign. Hernia: No hernia is present. There is no hernia in the ventral area or left inguinal area. Musculoskeletal:         General: Tenderness (right knee pain) present. No swelling, deformity or signs of injury. Cervical back: Normal range of motion and neck supple. No rigidity. No muscular tenderness. Right knee: No swelling, deformity, effusion, erythema, ecchymosis, lacerations or bony tenderness. Normal range of motion. No tenderness. No medial joint line, lateral joint line, MCL, LCL or patellar tendon tenderness. No LCL laxity or MCL laxity. Normal alignment, normal meniscus and normal patellar mobility. Right lower leg: No edema. Left lower leg: No edema. Legs:       Comments: Post op   looks good      Lymphadenopathy:      Cervical: No cervical adenopathy. Skin:     General: Skin is warm. Coloration: Skin is not jaundiced or pale. Findings: No bruising, erythema, lesion or rash. Neurological:      General: No focal deficit present. Mental Status: She is alert and oriented to person, place, and time. Cranial Nerves: No cranial nerve deficit. Sensory: No sensory deficit. Motor: No weakness or abnormal muscle tone. Coordination: Coordination normal.      Gait: Gait normal.      Deep Tendon Reflexes: Reflexes are normal and symmetric. Reflexes normal.       ASSESSMENT/PLAN  Eunice was seen today for hypertension. Diagnoses and all orders for this visit:    Primary hypertension  --patient is instructed on low to moderate sodium ( 2 to 2.5 grams ), daily    Also to increase potassium in the diet to about 3.5 grams daily    Literature is provided   --Increase her BB to 25 BID     History of total right knee replacement  Long talk on treatment and prevention  Literature is given   --good post op    Overweight (BMI 25.0-29.9)  --talk diet and weight loss     Essential hypertension  -     metoprolol succinate (TOPROL XL) 25 MG extended release tablet; TAKE 1 in AM and one in PM      Outpatient Encounter Medications as of 8/10/2022   Medication Sig Dispense Refill    Fexofenadine HCl (ALLEGRA PO) Take by mouth      metoprolol succinate (TOPROL XL) 25 MG extended release tablet TAKE 1 in AM and one in  tablet 1    [place] [facility] [place] medical opinion that [address] requires a disability parking placard for the following reasons:  She has limited walking ability due to an orthopedic condition.   Duration of need: 5 years 1 each 0    diphenhydrAMINE-APAP, sleep, (TYLENOL PM EXTRA STRENGTH PO) Take 2 tablets by mouth nightly      promethazine (PHENERGAN) 25 MG tablet Take 1 tablet by mouth daily as needed for Nausea 90 tablet 0    beclomethasone (QVAR) 80 MCG/ACT inhaler Inhale 1 puff into the lungs 2 times daily (Patient taking differently: Inhale 1 puff into the lungs 2 times daily as needed) 3 Inhaler 1    [DISCONTINUED] meloxicam (MOBIC) 15 MG tablet       [DISCONTINUED] nitrofurantoin, macrocrystal-monohydrate, (MACROBID) 100 MG capsule       [DISCONTINUED] metoprolol succinate (TOPROL XL) 25 MG extended release tablet TAKE 1 TABLET DAILY 90 tablet 1    [DISCONTINUED] albuterol sulfate HFA (VENTOLIN HFA) 108 (90 Base) MCG/ACT inhaler Inhale 2 puffs into the lungs every 6 hours as needed for Wheezing 3 each 1     No facility-administered encounter medications on file as of 8/10/2022. No follow-ups on file.         Reviewed recent labs related to Eunice's current problems      Discussed importance of regular Health Maintenance follow up  Health Maintenance   Topic    Hepatitis C screen     Pneumococcal 65+ years Vaccine (2 - PCV)    COVID-19 Vaccine (4 - Booster for [employer] series)    Flu vaccine (1)    Depression Screen     Breast cancer screen     Lipids     DTaP/Tdap/Td vaccine (2 - Td or Tdap)    Colorectal Cancer Screen     DEXA (modify frequency per FRAX score)     Shingles vaccine     Hepatitis A vaccine     Hepatitis B vaccine     Hib vaccine     Meningococcal (ACWY) vaccine

## 2022-08-10 NOTE — TELEPHONE ENCOUNTER
----- Message from Lissa Spence DO sent at 8/10/2022  3:19 PM EDT -----  Call and tell her that I have placed a couple orders to R/O a blood clot  I don't like just aspirin post op Knee  If chest pain or short of breath, go right to ER

## 2022-08-12 ENCOUNTER — HOSPITAL ENCOUNTER (OUTPATIENT)
Dept: CT IMAGING | Age: 67
Discharge: HOME | End: 2022-08-14
Payer: COMMERCIAL

## 2022-08-12 DIAGNOSIS — I26.99 PULMONARY EMBOLISM, OTHER, UNSPECIFIED CHRONICITY, UNSPECIFIED WHETHER ACUTE COR PULMONALE PRESENT (HCC): ICD-10-CM

## 2022-08-12 PROCEDURE — 71275 CT ANGIOGRAPHY CHEST: CPT

## 2022-08-12 PROCEDURE — 2580000003 HC RX 258: Performed by: RADIOLOGY

## 2022-08-12 PROCEDURE — 6360000004 HC RX CONTRAST MEDICATION: Performed by: RADIOLOGY

## 2022-08-12 RX ORDER — SODIUM CHLORIDE 0.9 % (FLUSH) 0.9 %
10 SYRINGE (ML) INJECTION PRN
Status: DISCONTINUED | OUTPATIENT
Start: 2022-08-12 | End: 2022-08-15 | Stop reason: HOSPADM

## 2022-08-12 RX ADMIN — IOPAMIDOL 75 ML: 755 INJECTION, SOLUTION INTRAVENOUS at 11:15

## 2022-08-12 RX ADMIN — SODIUM CHLORIDE, PRESERVATIVE FREE 10 ML: 5 INJECTION INTRAVENOUS at 11:15

## 2022-09-12 DIAGNOSIS — I10 ESSENTIAL HYPERTENSION: ICD-10-CM

## 2022-09-12 RX ORDER — METOPROLOL SUCCINATE 25 MG/1
TABLET, EXTENDED RELEASE ORAL
Qty: 90 TABLET | Refills: 3 | Status: SHIPPED | OUTPATIENT
Start: 2022-09-12

## 2022-09-15 ENCOUNTER — TELEPHONE (OUTPATIENT)
Dept: FAMILY MEDICINE CLINIC | Age: 67
End: 2022-09-15

## 2022-09-15 RX ORDER — AMOXICILLIN 500 MG/1
2 TABLET, FILM COATED ORAL ONCE
Qty: 20 TABLET | Refills: 0 | Status: CANCELLED | OUTPATIENT
Start: 2022-09-15 | End: 2022-09-15

## 2022-09-15 NOTE — TELEPHONE ENCOUNTER
Pt called stating that she needs antibiotics for pre med prior to dental work from total knee replacement. Pt broke a tooth yesterday and is seeing her dentist tomorrow morning for repair but needs the antibiotic sent in ASAP to [facility] in [place]. Please advise. Pt was advised by [facility] to take Amoxil 2G 1 hour prior to dental work.      Electronically signed by Anuj Andino MA on 9/15/22 at 10:07 AM EDT

## 2022-09-19 NOTE — TELEPHONE ENCOUNTER
This ma spoke with patient . Patient states she no longer needs this medication she was able to get in contact with [facility] and they called in it for her.    Electronically signed by Fredy Muhammad MA on 9/19/22 at 3:34 PM EDT

## 2023-02-16 DIAGNOSIS — I10 ESSENTIAL HYPERTENSION: ICD-10-CM

## 2023-02-17 RX ORDER — METOPROLOL SUCCINATE 25 MG/1
TABLET, EXTENDED RELEASE ORAL
Qty: 90 TABLET | Refills: 3 | Status: SHIPPED | OUTPATIENT
Start: 2023-02-17

## 2023-03-01 DIAGNOSIS — I10 ESSENTIAL HYPERTENSION: ICD-10-CM

## 2023-03-01 RX ORDER — METOPROLOL SUCCINATE 25 MG/1
25 TABLET, EXTENDED RELEASE ORAL 2 TIMES DAILY
Qty: 180 TABLET | Refills: 1 | Status: SHIPPED | OUTPATIENT
Start: 2023-03-01 | End: 2023-08-28

## 2023-03-01 NOTE — TELEPHONE ENCOUNTER
Pt called stating that the pharmacy was never sent an updated Rx for her Metoprolol (8/10/2022) and has been short on her pills. Pt asking for updated Rx to be sent to [facility]. Rx pended. Please review and sign.     Electronically signed by Hermelinda Echeverria MA on 3/1/23 at 10:07 AM EST

## 2023-05-03 ENCOUNTER — TELEPHONE (OUTPATIENT)
Dept: FAMILY MEDICINE CLINIC | Age: 68
End: 2023-05-03

## 2023-05-03 DIAGNOSIS — Z12.31 SCREENING MAMMOGRAM FOR BREAST CANCER: Primary | ICD-10-CM

## 2023-05-03 NOTE — TELEPHONE ENCOUNTER
----- Message from Jose Morales sent at 5/1/2023  2:12 PM EDT -----  Subject: Referral Request    Reason for referral request? routine mammogram   Provider patient wants to be referred to(if known):     Provider Phone Number(if known): Additional Information for Provider?  please, place pt. routine mammogram   order please, call pt. once order has been placed so she can get scheduled   thank you   ---------------------------------------------------------------------------  --------------  [place] [facility] INFO    [phone]; OK to leave message on voicemail  ---------------------------------------------------------------------------  --------------

## 2023-06-01 ENCOUNTER — HOSPITAL ENCOUNTER (OUTPATIENT)
Dept: MAMMOGRAPHY | Age: 68
Discharge: HOME | End: 2023-06-03

## 2023-06-01 DIAGNOSIS — Z12.31 SCREENING MAMMOGRAM FOR BREAST CANCER: ICD-10-CM

## 2023-08-10 DIAGNOSIS — I10 ESSENTIAL HYPERTENSION: ICD-10-CM

## 2023-08-10 RX ORDER — METOPROLOL SUCCINATE 25 MG/1
TABLET, EXTENDED RELEASE ORAL
Qty: 180 TABLET | Refills: 3 | OUTPATIENT
Start: 2023-08-10

## 2023-09-18 ENCOUNTER — TELEPHONE (OUTPATIENT)
Dept: FAMILY MEDICINE CLINIC | Age: 68
End: 2023-09-18

## 2023-09-18 ENCOUNTER — OFFICE VISIT (OUTPATIENT)
Dept: FAMILY MEDICINE CLINIC | Age: 68
End: 2023-09-18
Payer: COMMERCIAL

## 2023-09-18 VITALS
SYSTOLIC BLOOD PRESSURE: 138 MMHG | DIASTOLIC BLOOD PRESSURE: 86 MMHG | TEMPERATURE: 97.9 F | HEART RATE: 50 BPM | BODY MASS INDEX: 29.52 KG/M2 | RESPIRATION RATE: 18 BRPM | WEIGHT: 146.4 LBS | OXYGEN SATURATION: 98 % | HEIGHT: 59 IN

## 2023-09-18 DIAGNOSIS — I10 ESSENTIAL HYPERTENSION: ICD-10-CM

## 2023-09-18 DIAGNOSIS — E66.3 OVERWEIGHT (BMI 25.0-29.9): ICD-10-CM

## 2023-09-18 DIAGNOSIS — I10 PRIMARY HYPERTENSION: Primary | ICD-10-CM

## 2023-09-18 DIAGNOSIS — E78.5 DYSLIPIDEMIA: ICD-10-CM

## 2023-09-18 DIAGNOSIS — K44.9 HIATAL HERNIA: ICD-10-CM

## 2023-09-18 DIAGNOSIS — K21.9 GASTROESOPHAGEAL REFLUX DISEASE WITHOUT ESOPHAGITIS: ICD-10-CM

## 2023-09-18 DIAGNOSIS — N39.0 URINARY TRACT INFECTION WITHOUT HEMATURIA, SITE UNSPECIFIED: ICD-10-CM

## 2023-09-18 DIAGNOSIS — R73.03 PRE-DIABETES: ICD-10-CM

## 2023-09-18 PROBLEM — L98.9 SKIN LESION: Status: RESOLVED | Noted: 2018-04-18 | Resolved: 2023-09-18

## 2023-09-18 PROBLEM — L30.9 DERMATITIS: Status: RESOLVED | Noted: 2021-04-01 | Resolved: 2023-09-18

## 2023-09-18 PROBLEM — N30.01 ACUTE CYSTITIS WITH HEMATURIA: Status: RESOLVED | Noted: 2021-04-01 | Resolved: 2023-09-18

## 2023-09-18 PROCEDURE — 3079F DIAST BP 80-89 MM HG: CPT | Performed by: FAMILY MEDICINE

## 2023-09-18 PROCEDURE — 99214 OFFICE O/P EST MOD 30 MIN: CPT | Performed by: FAMILY MEDICINE

## 2023-09-18 PROCEDURE — 3075F SYST BP GE 130 - 139MM HG: CPT | Performed by: FAMILY MEDICINE

## 2023-09-18 PROCEDURE — 1123F ACP DISCUSS/DSCN MKR DOCD: CPT | Performed by: FAMILY MEDICINE

## 2023-09-18 RX ORDER — METHENAMINE HIPPURATE 1000 MG/1
1 TABLET ORAL DAILY
Qty: 90 TABLET | Refills: 1 | Status: SHIPPED | OUTPATIENT
Start: 2023-09-18

## 2023-09-18 RX ORDER — METOPROLOL SUCCINATE 25 MG/1
25 TABLET, EXTENDED RELEASE ORAL 2 TIMES DAILY
Qty: 180 TABLET | Refills: 1 | Status: SHIPPED
Start: 2023-09-18 | End: 2023-09-18 | Stop reason: SDUPTHER

## 2023-09-18 RX ORDER — METOPROLOL SUCCINATE 25 MG/1
25 TABLET, EXTENDED RELEASE ORAL 2 TIMES DAILY
Qty: 30 TABLET | Refills: 0 | Status: SHIPPED | OUTPATIENT
Start: 2023-09-18

## 2023-09-18 RX ORDER — METOPROLOL SUCCINATE 25 MG/1
25 TABLET, EXTENDED RELEASE ORAL 2 TIMES DAILY
Qty: 60 TABLET | Refills: 0 | Status: CANCELLED | OUTPATIENT
Start: 2023-09-18 | End: 2024-03-16

## 2023-09-18 SDOH — ECONOMIC STABILITY: FOOD INSECURITY: WITHIN THE PAST 12 MONTHS, THE FOOD YOU BOUGHT JUST DIDN'T LAST AND YOU DIDN'T HAVE MONEY TO GET MORE.: NEVER TRUE

## 2023-09-18 SDOH — ECONOMIC STABILITY: HOUSING INSECURITY
IN THE LAST 12 MONTHS, WAS THERE A TIME WHEN YOU DID NOT HAVE A STEADY PLACE TO SLEEP OR SLEPT IN A SHELTER (INCLUDING NOW)?: NO

## 2023-09-18 SDOH — ECONOMIC STABILITY: INCOME INSECURITY: HOW HARD IS IT FOR YOU TO PAY FOR THE VERY BASICS LIKE FOOD, HOUSING, MEDICAL CARE, AND HEATING?: NOT HARD AT ALL

## 2023-09-18 SDOH — ECONOMIC STABILITY: FOOD INSECURITY: WITHIN THE PAST 12 MONTHS, YOU WORRIED THAT YOUR FOOD WOULD RUN OUT BEFORE YOU GOT MONEY TO BUY MORE.: NEVER TRUE

## 2023-09-18 ASSESSMENT — ENCOUNTER SYMPTOMS
SHORTNESS OF BREATH: 0
RHINORRHEA: 0
COUGH: 0
BACK PAIN: 0
DIARRHEA: 0
WHEEZING: 0
GASTROINTESTINAL NEGATIVE: 1
EYE DISCHARGE: 0
BLOOD IN STOOL: 0
SORE THROAT: 0
CHEST TIGHTNESS: 0
EYE PAIN: 0
ABDOMINAL DISTENTION: 0
RECTAL PAIN: 0
VOMITING: 0
ALLERGIC/IMMUNOLOGIC NEGATIVE: 1
SINUS PRESSURE: 0
VOICE CHANGE: 0
BLURRED VISION: 0
ANAL BLEEDING: 0
STRIDOR: 0
RESPIRATORY NEGATIVE: 1
EYE ITCHING: 0
TROUBLE SWALLOWING: 0
EYE REDNESS: 0
ABDOMINAL PAIN: 0
COLOR CHANGE: 0
CONSTIPATION: 0
PHOTOPHOBIA: 0
APNEA: 0
CHOKING: 0
FACIAL SWELLING: 0
NAUSEA: 0
ORTHOPNEA: 0
SINUS PAIN: 0

## 2023-09-18 ASSESSMENT — PATIENT HEALTH QUESTIONNAIRE - PHQ9
SUM OF ALL RESPONSES TO PHQ QUESTIONS 1-9: 0
SUM OF ALL RESPONSES TO PHQ9 QUESTIONS 1 & 2: 0
SUM OF ALL RESPONSES TO PHQ QUESTIONS 1-9: 0
1. LITTLE INTEREST OR PLEASURE IN DOING THINGS: 0
2. FEELING DOWN, DEPRESSED OR HOPELESS: 0

## 2023-09-18 NOTE — PROGRESS NOTES
José Morley is a 70 y.o. female. HPI/Chief C/O:  Chief Complaint   Patient presents with    Annual Exam     Pt here for a physical.     Medication Refill     Pharmacy verified and meds pended. Pt needs short term medication sent to  [facility]. Referral - General     Pt needs a referral. Pt had an upper endoscopy that showed issues. Dr. Joan Nunez did it 5/2023 but cant take care of it. Urinary Tract Infection     Pt c/o of frequent UTI. Wants to know if she can have an antibiotic \"On hand\" for when it comes on      Allergies   Allergen Reactions    Molds & Smuts Swelling    Meperidine Nausea And Vomiting   The patient is here for a medication list and treatment planning review  We will go over our care planning goals as well as take care of all refills  We will set up labs as well        Hypertension  This is a chronic problem. The current episode started more than 1 year ago. The problem is controlled. Associated symptoms include anxiety and malaise/fatigue. Pertinent negatives include no blurred vision, chest pain, headaches, neck pain, orthopnea, palpitations, peripheral edema, PND, shortness of breath or sweats. Risk factors for coronary artery disease include post-menopausal state and family history. Past treatments include lifestyle changes and beta blockers. The current treatment provides significant improvement. Compliance problems include exercise and diet. There is no history of angina, kidney disease, CAD/MI, CVA, heart failure, left ventricular hypertrophy, PVD or retinopathy. There is no history of chronic renal disease, coarctation of the aorta, hyperaldosteronism, hypercortisolism, hyperparathyroidism, a hypertension causing med, pheochromocytoma, renovascular disease, sleep apnea or a thyroid problem. ROS:  Review of Systems   Constitutional:  Positive for malaise/fatigue.  Negative for activity change, appetite change, chills, diaphoresis, fatigue, fever and

## 2023-10-05 ENCOUNTER — HOSPITAL ENCOUNTER (OUTPATIENT)
Dept: MAMMOGRAPHY | Age: 68
Discharge: HOME | End: 2023-10-07
Payer: COMMERCIAL

## 2023-10-05 VITALS — HEIGHT: 59 IN | BODY MASS INDEX: 28.22 KG/M2 | WEIGHT: 140 LBS

## 2023-10-05 PROCEDURE — 77063 BREAST TOMOSYNTHESIS BI: CPT

## 2024-02-27 DIAGNOSIS — I10 ESSENTIAL HYPERTENSION: ICD-10-CM

## 2024-02-27 RX ORDER — METOPROLOL SUCCINATE 25 MG/1
TABLET, EXTENDED RELEASE ORAL
Qty: 180 TABLET | Refills: 0 | Status: SHIPPED | OUTPATIENT
Start: 2024-02-27

## 2024-02-27 NOTE — TELEPHONE ENCOUNTER
Last Appointment:  9/18/2023  Future Appointments   Date Time Provider Department Center   3/18/2024  9:45 AM Gary Carreon, DO [facility]

## 2024-03-11 DIAGNOSIS — N39.0 URINARY TRACT INFECTION WITHOUT HEMATURIA, SITE UNSPECIFIED: ICD-10-CM

## 2024-03-11 RX ORDER — METHENAMINE HIPPURATE 1000 MG/1
1 TABLET ORAL DAILY
Qty: 90 TABLET | Refills: 0 | Status: SHIPPED | OUTPATIENT
Start: 2024-03-11

## 2024-03-11 NOTE — TELEPHONE ENCOUNTER
Last Appointment:  9/18/2023  Future Appointments   Date Time Provider Department Center   3/18/2024  9:45 AM Gary Carreon DO [facility]    Electronically signed by Luyd Ya LPN on 3/11/24 at 8:28 AM EDT

## 2024-05-27 DIAGNOSIS — I10 ESSENTIAL HYPERTENSION: ICD-10-CM

## 2024-05-28 RX ORDER — METOPROLOL SUCCINATE 25 MG/1
TABLET, EXTENDED RELEASE ORAL
Qty: 180 TABLET | Refills: 0 | Status: SHIPPED | OUTPATIENT
Start: 2024-05-28

## 2024-08-26 DIAGNOSIS — I10 ESSENTIAL HYPERTENSION: ICD-10-CM

## 2024-08-26 RX ORDER — METOPROLOL SUCCINATE 25 MG/1
25 TABLET, EXTENDED RELEASE ORAL 2 TIMES DAILY
Qty: 60 TABLET | Refills: 0 | Status: SHIPPED | OUTPATIENT
Start: 2024-08-26 | End: 2024-09-25

## 2024-08-26 NOTE — TELEPHONE ENCOUNTER
Pt needs appt for refill.     Electronically signed by CORRINA BHATTI MA on 8/26/24 at 11:03 AM EDT     Hemigard Intro: Due to skin fragility and wound tension, it was decided to use HEMIGARD adhesive retention suture devices to permit a linear closure. The skin was cleaned and dried for a 6cm distance away from the wound. Excessive hair, if present, was removed to allow for adhesion.

## 2024-12-15 ENCOUNTER — APPOINTMENT (OUTPATIENT)
Dept: CT IMAGING | Age: 69
End: 2024-12-15
Payer: COMMERCIAL

## 2024-12-15 ENCOUNTER — APPOINTMENT (OUTPATIENT)
Dept: GENERAL RADIOLOGY | Age: 69
End: 2024-12-15
Payer: COMMERCIAL

## 2024-12-15 ENCOUNTER — HOSPITAL ENCOUNTER (EMERGENCY)
Age: 69
Discharge: HOME | End: 2024-12-16
Attending: STUDENT IN AN ORGANIZED HEALTH CARE EDUCATION/TRAINING PROGRAM
Payer: COMMERCIAL

## 2024-12-15 DIAGNOSIS — R11.2 INTRACTABLE VOMITING WITH NAUSEA: Primary | ICD-10-CM

## 2024-12-15 DIAGNOSIS — N13.30 HYDROURETERONEPHROSIS: ICD-10-CM

## 2024-12-15 DIAGNOSIS — N20.1 URETERIC STONE: ICD-10-CM

## 2024-12-15 LAB
ALBUMIN SERPL-MCNC: 3.1 G/DL (ref 3.5–5.2)
ALP SERPL-CCNC: 210 U/L (ref 35–104)
ALT SERPL-CCNC: 9 U/L (ref 0–32)
ANION GAP SERPL CALCULATED.3IONS-SCNC: 10 MMOL/L (ref 7–16)
AST SERPL-CCNC: 14 U/L (ref 0–31)
BASOPHILS # BLD: 0.06 K/UL (ref 0–0.2)
BASOPHILS NFR BLD: 1 % (ref 0–2)
BILIRUB SERPL-MCNC: 0.8 MG/DL (ref 0–1.2)
BUN SERPL-MCNC: 12 MG/DL (ref 6–23)
CALCIUM SERPL-MCNC: 8.7 MG/DL (ref 8.6–10.2)
CHLORIDE SERPL-SCNC: 100 MMOL/L (ref 98–107)
CO2 SERPL-SCNC: 27 MMOL/L (ref 22–29)
CREAT SERPL-MCNC: 0.7 MG/DL (ref 0.5–1)
EOSINOPHIL # BLD: 0.09 K/UL (ref 0.05–0.5)
EOSINOPHILS RELATIVE PERCENT: 1 % (ref 0–6)
ERYTHROCYTE [DISTWIDTH] IN BLOOD BY AUTOMATED COUNT: 19.5 % (ref 11.5–15)
GFR, ESTIMATED: >90 ML/MIN/1.73M2
GLUCOSE SERPL-MCNC: 118 MG/DL (ref 74–99)
HCT VFR BLD AUTO: 46.1 % (ref 34–48)
HGB BLD-MCNC: 14.5 G/DL (ref 11.5–15.5)
IMM GRANULOCYTES # BLD AUTO: 0.04 K/UL (ref 0–0.58)
IMM GRANULOCYTES NFR BLD: 0 % (ref 0–5)
LACTATE BLDV-SCNC: 1.6 MMOL/L (ref 0.5–2.2)
LACTATE BLDV-SCNC: 2.8 MMOL/L (ref 0.5–2.2)
LIPASE SERPL-CCNC: 10 U/L (ref 13–60)
LYMPHOCYTES NFR BLD: 2.15 K/UL (ref 1.5–4)
LYMPHOCYTES RELATIVE PERCENT: 20 % (ref 20–42)
MCH RBC QN AUTO: 27.5 PG (ref 26–35)
MCHC RBC AUTO-ENTMCNC: 31.5 G/DL (ref 32–34.5)
MCV RBC AUTO: 87.3 FL (ref 80–99.9)
MONOCYTES NFR BLD: 0.58 K/UL (ref 0.1–0.95)
MONOCYTES NFR BLD: 6 % (ref 2–12)
NEUTROPHILS NFR BLD: 72 % (ref 43–80)
NEUTS SEG NFR BLD: 7.67 K/UL (ref 1.8–7.3)
PLATELET # BLD AUTO: 432 K/UL (ref 130–450)
PMV BLD AUTO: 10.2 FL (ref 7–12)
POTASSIUM SERPL-SCNC: 3.7 MMOL/L (ref 3.5–5)
PROT SERPL-MCNC: 7.1 G/DL (ref 6.4–8.3)
RBC # BLD AUTO: 5.28 M/UL (ref 3.5–5.5)
SODIUM SERPL-SCNC: 137 MMOL/L (ref 132–146)
TROPONIN I SERPL HS-MCNC: 11 NG/L (ref 0–9)
TROPONIN I SERPL HS-MCNC: 12 NG/L (ref 0–9)
WBC OTHER # BLD: 10.6 K/UL (ref 4.5–11.5)

## 2024-12-15 PROCEDURE — 93005 ELECTROCARDIOGRAM TRACING: CPT | Performed by: STUDENT IN AN ORGANIZED HEALTH CARE EDUCATION/TRAINING PROGRAM

## 2024-12-15 PROCEDURE — 6360000002 HC RX W HCPCS: Performed by: STUDENT IN AN ORGANIZED HEALTH CARE EDUCATION/TRAINING PROGRAM

## 2024-12-15 PROCEDURE — 6360000004 HC RX CONTRAST MEDICATION: Performed by: RADIOLOGY

## 2024-12-15 PROCEDURE — 71045 X-RAY EXAM CHEST 1 VIEW: CPT

## 2024-12-15 PROCEDURE — 96365 THER/PROPH/DIAG IV INF INIT: CPT

## 2024-12-15 PROCEDURE — 83605 ASSAY OF LACTIC ACID: CPT

## 2024-12-15 PROCEDURE — 83690 ASSAY OF LIPASE: CPT

## 2024-12-15 PROCEDURE — 96374 THER/PROPH/DIAG INJ IV PUSH: CPT

## 2024-12-15 PROCEDURE — 74177 CT ABD & PELVIS W/CONTRAST: CPT

## 2024-12-15 PROCEDURE — 80053 COMPREHEN METABOLIC PANEL: CPT

## 2024-12-15 PROCEDURE — 96375 TX/PRO/DX INJ NEW DRUG ADDON: CPT

## 2024-12-15 PROCEDURE — 85025 COMPLETE CBC W/AUTO DIFF WBC: CPT

## 2024-12-15 PROCEDURE — 99285 EMERGENCY DEPT VISIT HI MDM: CPT

## 2024-12-15 PROCEDURE — 84484 ASSAY OF TROPONIN QUANT: CPT

## 2024-12-15 PROCEDURE — 2580000003 HC RX 258: Performed by: STUDENT IN AN ORGANIZED HEALTH CARE EDUCATION/TRAINING PROGRAM

## 2024-12-15 PROCEDURE — 96361 HYDRATE IV INFUSION ADD-ON: CPT

## 2024-12-15 RX ORDER — ONDANSETRON 2 MG/ML
4 INJECTION INTRAMUSCULAR; INTRAVENOUS ONCE
Status: COMPLETED | OUTPATIENT
Start: 2024-12-15 | End: 2024-12-15

## 2024-12-15 RX ORDER — 0.9 % SODIUM CHLORIDE 0.9 %
1000 INTRAVENOUS SOLUTION INTRAVENOUS ONCE
Status: COMPLETED | OUTPATIENT
Start: 2024-12-15 | End: 2024-12-15

## 2024-12-15 RX ORDER — IOPAMIDOL 755 MG/ML
75 INJECTION, SOLUTION INTRAVASCULAR
Status: COMPLETED | OUTPATIENT
Start: 2024-12-15 | End: 2024-12-15

## 2024-12-15 RX ORDER — MORPHINE SULFATE 4 MG/ML
4 INJECTION, SOLUTION INTRAMUSCULAR; INTRAVENOUS ONCE
Status: COMPLETED | OUTPATIENT
Start: 2024-12-15 | End: 2024-12-15

## 2024-12-15 RX ORDER — IOPAMIDOL 755 MG/ML
18 INJECTION, SOLUTION INTRAVASCULAR
Status: COMPLETED | OUTPATIENT
Start: 2024-12-15 | End: 2024-12-15

## 2024-12-15 RX ADMIN — IOPAMIDOL 18 ML: 755 INJECTION, SOLUTION INTRAVENOUS at 23:17

## 2024-12-15 RX ADMIN — SODIUM CHLORIDE 1000 ML: 9 INJECTION, SOLUTION INTRAVENOUS at 21:15

## 2024-12-15 RX ADMIN — ONDANSETRON 4 MG: 2 INJECTION, SOLUTION INTRAMUSCULAR; INTRAVENOUS at 20:38

## 2024-12-15 RX ADMIN — MORPHINE SULFATE 4 MG: 4 INJECTION, SOLUTION INTRAMUSCULAR; INTRAVENOUS at 20:35

## 2024-12-15 RX ADMIN — IOPAMIDOL 75 ML: 755 INJECTION, SOLUTION INTRAVENOUS at 23:17

## 2024-12-15 ASSESSMENT — PAIN DESCRIPTION - DESCRIPTORS: DESCRIPTORS: DULL;ACHING

## 2024-12-15 ASSESSMENT — PAIN SCALES - GENERAL: PAINLEVEL_OUTOF10: 3

## 2024-12-15 ASSESSMENT — PAIN DESCRIPTION - ORIENTATION: ORIENTATION: LEFT;LOWER

## 2024-12-15 ASSESSMENT — LIFESTYLE VARIABLES
HOW OFTEN DO YOU HAVE A DRINK CONTAINING ALCOHOL: NEVER
HOW MANY STANDARD DRINKS CONTAINING ALCOHOL DO YOU HAVE ON A TYPICAL DAY: PATIENT DOES NOT DRINK

## 2024-12-15 ASSESSMENT — PAIN DESCRIPTION - LOCATION: LOCATION: ABDOMEN;BACK

## 2024-12-16 ENCOUNTER — APPOINTMENT (OUTPATIENT)
Dept: GENERAL RADIOLOGY | Age: 69
End: 2024-12-16
Payer: COMMERCIAL

## 2024-12-16 VITALS
SYSTOLIC BLOOD PRESSURE: 129 MMHG | OXYGEN SATURATION: 96 % | DIASTOLIC BLOOD PRESSURE: 77 MMHG | HEART RATE: 90 BPM | BODY MASS INDEX: 19.15 KG/M2 | RESPIRATION RATE: 23 BRPM | WEIGHT: 95 LBS | TEMPERATURE: 98.2 F | HEIGHT: 59 IN

## 2024-12-16 PROBLEM — N20.0 KIDNEY STONE: Status: ACTIVE | Noted: 2024-12-16

## 2024-12-16 PROBLEM — I82.409 DVT (DEEP VENOUS THROMBOSIS) (HCC): Status: ACTIVE | Noted: 2024-12-16

## 2024-12-16 LAB
BILIRUB UR QL STRIP: NEGATIVE
CLARITY UR: ABNORMAL
COLOR UR: ABNORMAL
EKG ATRIAL RATE: 90 BPM
EKG P AXIS: 22 DEGREES
EKG P-R INTERVAL: 162 MS
EKG Q-T INTERVAL: 382 MS
EKG QRS DURATION: 90 MS
EKG QTC CALCULATION (BAZETT): 467 MS
EKG R AXIS: -38 DEGREES
EKG T AXIS: 4 DEGREES
EKG VENTRICULAR RATE: 90 BPM
GLUCOSE UR STRIP-MCNC: NEGATIVE MG/DL
HGB UR QL STRIP.AUTO: ABNORMAL
KETONES UR STRIP-MCNC: NEGATIVE MG/DL
LEUKOCYTE ESTERASE UR QL STRIP: ABNORMAL
NITRITE UR QL STRIP: POSITIVE
PH UR STRIP: 6.5 [PH] (ref 5–9)
PROT UR STRIP-MCNC: 30 MG/DL
RBC #/AREA URNS HPF: ABNORMAL /HPF
SP GR UR STRIP: 1.01 (ref 1–1.03)
UROBILINOGEN UR STRIP-ACNC: 0.2 EU/DL (ref 0–1)
WBC #/AREA URNS HPF: ABNORMAL /HPF

## 2024-12-16 PROCEDURE — 6360000002 HC RX W HCPCS: Performed by: INTERNAL MEDICINE

## 2024-12-16 PROCEDURE — 6360000002 HC RX W HCPCS: Performed by: STUDENT IN AN ORGANIZED HEALTH CARE EDUCATION/TRAINING PROGRAM

## 2024-12-16 PROCEDURE — 96375 TX/PRO/DX INJ NEW DRUG ADDON: CPT

## 2024-12-16 PROCEDURE — 94640 AIRWAY INHALATION TREATMENT: CPT

## 2024-12-16 PROCEDURE — 2580000003 HC RX 258: Performed by: STUDENT IN AN ORGANIZED HEALTH CARE EDUCATION/TRAINING PROGRAM

## 2024-12-16 PROCEDURE — 6370000000 HC RX 637 (ALT 250 FOR IP): Performed by: INTERNAL MEDICINE

## 2024-12-16 PROCEDURE — C1769 GUIDE WIRE: HCPCS | Performed by: UROLOGY

## 2024-12-16 PROCEDURE — 93010 ELECTROCARDIOGRAM REPORT: CPT | Performed by: INTERNAL MEDICINE

## 2024-12-16 PROCEDURE — 2709999900 HC NON-CHARGEABLE SUPPLY: Performed by: UROLOGY

## 2024-12-16 PROCEDURE — 2580000003 HC RX 258: Performed by: INTERNAL MEDICINE

## 2024-12-16 PROCEDURE — 96376 TX/PRO/DX INJ SAME DRUG ADON: CPT

## 2024-12-16 PROCEDURE — 81001 URINALYSIS AUTO W/SCOPE: CPT

## 2024-12-16 PROCEDURE — 87040 BLOOD CULTURE FOR BACTERIA: CPT

## 2024-12-16 RX ORDER — HALOPERIDOL 5 MG/ML
1 INJECTION INTRAMUSCULAR
Status: CANCELLED | OUTPATIENT
Start: 2024-12-16 | End: 2024-12-17

## 2024-12-16 RX ORDER — SODIUM CHLORIDE 9 MG/ML
INJECTION, SOLUTION INTRAVENOUS PRN
Status: DISCONTINUED | OUTPATIENT
Start: 2024-12-16 | End: 2024-12-16 | Stop reason: HOSPADM

## 2024-12-16 RX ORDER — ACETAMINOPHEN 650 MG/1
650 SUPPOSITORY RECTAL EVERY 6 HOURS PRN
Status: DISCONTINUED | OUTPATIENT
Start: 2024-12-16 | End: 2024-12-16 | Stop reason: HOSPADM

## 2024-12-16 RX ORDER — IPRATROPIUM BROMIDE AND ALBUTEROL SULFATE 2.5; .5 MG/3ML; MG/3ML
1 SOLUTION RESPIRATORY (INHALATION)
Status: CANCELLED | OUTPATIENT
Start: 2024-12-16 | End: 2024-12-17

## 2024-12-16 RX ORDER — DIPHENHYDRAMINE HYDROCHLORIDE 50 MG/ML
12.5 INJECTION INTRAMUSCULAR; INTRAVENOUS
Status: CANCELLED | OUTPATIENT
Start: 2024-12-16 | End: 2024-12-17

## 2024-12-16 RX ORDER — POLYETHYLENE GLYCOL 3350 17 G/17G
17 POWDER, FOR SOLUTION ORAL DAILY PRN
Status: DISCONTINUED | OUTPATIENT
Start: 2024-12-16 | End: 2024-12-16 | Stop reason: HOSPADM

## 2024-12-16 RX ORDER — SODIUM CHLORIDE 0.9 % (FLUSH) 0.9 %
5-40 SYRINGE (ML) INJECTION PRN
Status: CANCELLED | OUTPATIENT
Start: 2024-12-16

## 2024-12-16 RX ORDER — MORPHINE SULFATE 4 MG/ML
4 INJECTION, SOLUTION INTRAMUSCULAR; INTRAVENOUS ONCE
Status: COMPLETED | OUTPATIENT
Start: 2024-12-16 | End: 2024-12-16

## 2024-12-16 RX ORDER — SODIUM CHLORIDE 0.9 % (FLUSH) 0.9 %
5-40 SYRINGE (ML) INJECTION PRN
Status: DISCONTINUED | OUTPATIENT
Start: 2024-12-16 | End: 2024-12-16 | Stop reason: HOSPADM

## 2024-12-16 RX ORDER — SODIUM CHLORIDE, SODIUM LACTATE, POTASSIUM CHLORIDE, CALCIUM CHLORIDE 600; 310; 30; 20 MG/100ML; MG/100ML; MG/100ML; MG/100ML
INJECTION, SOLUTION INTRAVENOUS CONTINUOUS
Status: DISCONTINUED | OUTPATIENT
Start: 2024-12-16 | End: 2024-12-16

## 2024-12-16 RX ORDER — ONDANSETRON 2 MG/ML
4 INJECTION INTRAMUSCULAR; INTRAVENOUS EVERY 6 HOURS PRN
Status: DISCONTINUED | OUTPATIENT
Start: 2024-12-16 | End: 2024-12-16 | Stop reason: HOSPADM

## 2024-12-16 RX ORDER — LABETALOL HYDROCHLORIDE 5 MG/ML
10 INJECTION, SOLUTION INTRAVENOUS
Status: CANCELLED | OUTPATIENT
Start: 2024-12-16

## 2024-12-16 RX ORDER — METOPROLOL SUCCINATE 25 MG/1
25 TABLET, EXTENDED RELEASE ORAL 2 TIMES DAILY
Status: DISCONTINUED | OUTPATIENT
Start: 2024-12-16 | End: 2024-12-16 | Stop reason: HOSPADM

## 2024-12-16 RX ORDER — POTASSIUM CHLORIDE 7.45 MG/ML
10 INJECTION INTRAVENOUS PRN
Status: DISCONTINUED | OUTPATIENT
Start: 2024-12-16 | End: 2024-12-16 | Stop reason: HOSPADM

## 2024-12-16 RX ORDER — NALOXONE HYDROCHLORIDE 0.4 MG/ML
INJECTION, SOLUTION INTRAMUSCULAR; INTRAVENOUS; SUBCUTANEOUS PRN
Status: CANCELLED | OUTPATIENT
Start: 2024-12-16

## 2024-12-16 RX ORDER — ACETAMINOPHEN 325 MG/1
650 TABLET ORAL EVERY 6 HOURS PRN
Status: DISCONTINUED | OUTPATIENT
Start: 2024-12-16 | End: 2024-12-16 | Stop reason: HOSPADM

## 2024-12-16 RX ORDER — ONDANSETRON 4 MG/1
4 TABLET, ORALLY DISINTEGRATING ORAL EVERY 8 HOURS PRN
Status: DISCONTINUED | OUTPATIENT
Start: 2024-12-16 | End: 2024-12-16 | Stop reason: HOSPADM

## 2024-12-16 RX ORDER — BUDESONIDE 0.25 MG/2ML
0.25 INHALANT ORAL 2 TIMES DAILY
Status: DISCONTINUED | OUTPATIENT
Start: 2024-12-16 | End: 2024-12-16 | Stop reason: HOSPADM

## 2024-12-16 RX ORDER — SODIUM CHLORIDE 9 MG/ML
INJECTION, SOLUTION INTRAVENOUS PRN
Status: CANCELLED | OUTPATIENT
Start: 2024-12-16

## 2024-12-16 RX ORDER — KETOROLAC TROMETHAMINE 15 MG/ML
15 INJECTION, SOLUTION INTRAMUSCULAR; INTRAVENOUS ONCE
Status: CANCELLED | OUTPATIENT
Start: 2024-12-16 | End: 2024-12-16

## 2024-12-16 RX ORDER — DIPHENHYDRAMINE HYDROCHLORIDE 50 MG/ML
25 INJECTION INTRAMUSCULAR; INTRAVENOUS ONCE
Status: COMPLETED | OUTPATIENT
Start: 2024-12-16 | End: 2024-12-16

## 2024-12-16 RX ORDER — MAGNESIUM SULFATE IN WATER 40 MG/ML
2000 INJECTION, SOLUTION INTRAVENOUS PRN
Status: DISCONTINUED | OUTPATIENT
Start: 2024-12-16 | End: 2024-12-16 | Stop reason: HOSPADM

## 2024-12-16 RX ORDER — TAMSULOSIN HYDROCHLORIDE 0.4 MG/1
0.4 CAPSULE ORAL DAILY
Status: DISCONTINUED | OUTPATIENT
Start: 2024-12-16 | End: 2024-12-16 | Stop reason: HOSPADM

## 2024-12-16 RX ORDER — METOCLOPRAMIDE HYDROCHLORIDE 5 MG/ML
10 INJECTION INTRAMUSCULAR; INTRAVENOUS ONCE
Status: COMPLETED | OUTPATIENT
Start: 2024-12-16 | End: 2024-12-16

## 2024-12-16 RX ORDER — SODIUM CHLORIDE 0.9 % (FLUSH) 0.9 %
5-40 SYRINGE (ML) INJECTION EVERY 12 HOURS SCHEDULED
Status: CANCELLED | OUTPATIENT
Start: 2024-12-16

## 2024-12-16 RX ORDER — TAMSULOSIN HYDROCHLORIDE 0.4 MG/1
0.4 CAPSULE ORAL DAILY
Qty: 14 CAPSULE | Refills: 0 | Status: SHIPPED | OUTPATIENT
Start: 2024-12-16

## 2024-12-16 RX ORDER — HYDRALAZINE HYDROCHLORIDE 20 MG/ML
10 INJECTION INTRAMUSCULAR; INTRAVENOUS
Status: CANCELLED | OUTPATIENT
Start: 2024-12-16

## 2024-12-16 RX ORDER — PROCHLORPERAZINE EDISYLATE 5 MG/ML
5 INJECTION INTRAMUSCULAR; INTRAVENOUS
Status: CANCELLED | OUTPATIENT
Start: 2024-12-16 | End: 2024-12-17

## 2024-12-16 RX ORDER — POTASSIUM CHLORIDE 1500 MG/1
40 TABLET, EXTENDED RELEASE ORAL PRN
Status: DISCONTINUED | OUTPATIENT
Start: 2024-12-16 | End: 2024-12-16 | Stop reason: HOSPADM

## 2024-12-16 RX ORDER — SODIUM CHLORIDE 0.9 % (FLUSH) 0.9 %
5-40 SYRINGE (ML) INJECTION EVERY 12 HOURS SCHEDULED
Status: DISCONTINUED | OUTPATIENT
Start: 2024-12-16 | End: 2024-12-16 | Stop reason: HOSPADM

## 2024-12-16 RX ADMIN — SODIUM CHLORIDE, PRESERVATIVE FREE 10 ML: 5 INJECTION INTRAVENOUS at 12:19

## 2024-12-16 RX ADMIN — METOPROLOL SUCCINATE 25 MG: 25 TABLET, EXTENDED RELEASE ORAL at 12:19

## 2024-12-16 RX ADMIN — WATER 2000 MG: 1 INJECTION INTRAMUSCULAR; INTRAVENOUS; SUBCUTANEOUS at 01:02

## 2024-12-16 RX ADMIN — MORPHINE SULFATE 4 MG: 4 INJECTION, SOLUTION INTRAMUSCULAR; INTRAVENOUS at 01:01

## 2024-12-16 RX ADMIN — SODIUM CHLORIDE, POTASSIUM CHLORIDE, SODIUM LACTATE AND CALCIUM CHLORIDE: 600; 310; 30; 20 INJECTION, SOLUTION INTRAVENOUS at 12:22

## 2024-12-16 RX ADMIN — DIPHENHYDRAMINE HYDROCHLORIDE 25 MG: 50 INJECTION INTRAMUSCULAR; INTRAVENOUS at 00:56

## 2024-12-16 RX ADMIN — BUDESONIDE 250 MCG: 0.25 SUSPENSION RESPIRATORY (INHALATION) at 13:16

## 2024-12-16 RX ADMIN — APIXABAN 5 MG: 5 TABLET, FILM COATED ORAL at 12:18

## 2024-12-16 RX ADMIN — METOCLOPRAMIDE HYDROCHLORIDE 10 MG: 5 INJECTION INTRAMUSCULAR; INTRAVENOUS at 00:55

## 2024-12-16 ASSESSMENT — PAIN SCALES - GENERAL
PAINLEVEL_OUTOF10: 0
PAINLEVEL_OUTOF10: 3

## 2024-12-16 ASSESSMENT — PAIN DESCRIPTION - LOCATION: LOCATION: ABDOMEN;BACK

## 2024-12-16 ASSESSMENT — PAIN DESCRIPTION - DESCRIPTORS: DESCRIPTORS: ACHING;DULL

## 2024-12-16 ASSESSMENT — PAIN DESCRIPTION - ORIENTATION: ORIENTATION: LEFT

## 2024-12-16 NOTE — ED PROVIDER NOTES
[facility] EMERGENCY DEPARTMENT  EMERGENCY DEPARTMENT ENCOUNTER        Pt Name: Eunice Sutherland  MRN: [id number]  Birthdate 1955  Date of evaluation: 12/15/2024  Provider: Misha Kilpatrick DO  PCP: Antonio Cole MD  Note Started: 8:12 PM EST 12/15/24    CHIEF COMPLAINT       Chief Complaint   Patient presents with    Abdominal Pain     Hematuria/ had surg in [facility] in August/ gastrectomy/ deneis vomiting       HISTORY OF PRESENT ILLNESS: 1 or more Elements   History From: Patient    Limitations to history : None    Eunice Sutherland is a 69 y.o. female who presents to emergency department for evaluation of abdominal pain, hematuria.  Symptoms that started this morning.  She has significant history of esophagectomy and gastrectomy in [facility] in August as well as anastomosis leak.  She also has a history of DVTs, on Eliquis, with an IVC filter in place.  She denies any past history of kidney stones.  She is having some associated flank pain.  No fevers.  She does have nausea but no vomiting.  She says she has some chest pain but that is always there and not new.  She denies any tearing or ripping sensation.  No numbness or weakness in the extremities.    Nursing Notes were all reviewed and agreed with or any disagreements were addressed in the HPI.      REVIEW OF EXTERNAL NOTE :       External chart review was performed by me, patient has significant past surgical history of distal esophagectomy, proximal gastrectomy with EG anastomosis and history of anastomosis leak, performed at the [facility].    REVIEW OF SYSTEMS :           Positives and Pertinent negatives as per HPI.     SURGICAL HISTORY     Past Surgical History:   Procedure Laterality Date    CARPAL TUNNEL RELEASE       SECTION      GASTRIC FUNDOPLICATION      HYSTERECTOMY (CERVIX STATUS UNKNOWN)      HYSTERECTOMY, VAGINAL      LASIK      NASAL SEPTUM SURGERY         CURRENTMEDICATIONS

## 2024-12-16 NOTE — H&P
as coordinating care.    Ken Cole MD  10:56 AM  12/16/2024    NOTE:  This report was transcribed using voice recognition software.  Every effort was made to ensure accuracy; however, inadvertent computerized transcription errors may be present.

## 2024-12-16 NOTE — DISCHARGE INSTR - COC
No intake/output data recorded.    Safety Concerns:     { [facility] Safety Concerns:561733757}    Impairments/Disabilities:      {[facility] Impairments/Disabilities:520201699}    Nutrition Therapy:  Current Nutrition Therapy:   { [facility] Diet List:079807117}    Routes of Feeding: {[facility] DME Other Feedings:366476362}  Liquids: {Slp liquid thickness:92515}  Daily Fluid Restriction: {[facility] DME Yes amt example:710456262}  Last Modified Barium Swallow with Video (Video Swallowing Test): {Done Not Done Date:}    Treatments at the Time of Hospital Discharge:   Respiratory Treatments: ***  Oxygen Therapy:  {Therapy; copd oxygen:27005}  Ventilator:    {[facility] Vent List:637951882}    Rehab Therapies: {THERAPEUTIC INTERVENTION:0815881275}  Weight Bearing Status/Restrictions: {[facility] Weight Bearin}  Other Medical Equipment (for information only, NOT a DME order):  {EQUIPMENT:977521533}  Other Treatments: ***    Patient's personal belongings (please select all that are sent with patient):  {[facility] DME Belongings:410317226}    RN SIGNATURE:  {Esignature:103970677}    CASE MANAGEMENT/SOCIAL WORK SECTION    Inpatient Status Date: ***    Readmission Risk Assessment Score:  [facility] RISK OF UNPLANNED READMISSION 2.0             0 Total Score        Discharging to Facility/ Agency   Name:   Address:  Phone:  Fax:    Dialysis Facility (if applicable)   Name:  Address:  Dialysis Schedule:  Phone:  Fax:    / signature: {Esignature:119770703}    PHYSICIAN SECTION    Prognosis: {Prognosis:4871616495}    Condition at Discharge: { Patient Condition:894462867}    Rehab Potential (if transferring to Rehab): {Prognosis:3287899671}    Recommended Labs or Other Treatments After Discharge: ***    Physician Certification: I certify the above information and transfer of Eunice Sutherland  is necessary for the continuing treatment of the diagnosis listed and that she requires {Admit to Appropriate Level of Care:81639}

## 2024-12-16 NOTE — CONSULTS
preformed      Past Medical History:   Diagnosis Date    Asthma     GERD (gastroesophageal reflux disease)     Hernia     Hypertension        Past Surgical History:   Procedure Laterality Date    CARPAL TUNNEL RELEASE       SECTION      GASTRIC FUNDOPLICATION      HYSTERECTOMY (CERVIX STATUS UNKNOWN)      HYSTERECTOMY, VAGINAL      LASIK      NASAL SEPTUM SURGERY         Medications Prior to Admission:    Not in a hospital admission.    Allergies:    Molds & smuts and Meperidine    Social History:    reports that she has never smoked. She has never used smokeless tobacco. She reports that she does not drink alcohol and does not use drugs.    Family History:   Non-contributory to this urological problem  family history includes Arthritis in her sister; Glaucoma in her brother; Heart Disease in her father and mother; Heart Surgery in her mother; Other in her father; Prostate Cancer in her father; Stroke in her mother.    Review of Systems:  Respiratory: negative for cough and hemoptysis  Cardiovascular: negative for chest pain and dyspnea  Gastrointestinal: negative for abdominal pain, diarrhea, nausea and vomiting  Derm: negative for rash and skin lesion(s)  Neurological: negative for seizures and tremors  Endocrine: negative for diabetic symptoms including polydipsia and polyuria  : As above in the HPI, otherwise negative  All other reviews are negative    Physical Exam:   Vitals: /82   Pulse (!) 102   Temp 98.2 °F (36.8 °C)   Resp 25   Ht 1.499 m (4' 11\")   Wt 43.1 kg (95 lb)   LMP  (LMP Unknown)   SpO2 93%   BMI 19.19 kg/m²   General:  Awake, alert, oriented X 3.  Well developed, well nourished, well groomed.  No apparent distress.  HEENT:  Normocephalic, atraumatic.  Pupils equal, round.  No scleral icterus.  No conjunctival injection.  Normal lips, teeth, and gums.  No nasal discharge.  Neck:  Supple, no masses.  Heart:  RRR  Lungs:  No audible wheezing.  Respirations symmetric and

## 2024-12-16 NOTE — DISCHARGE INSTRUCTIONS
Return if you have worsening pain, decreased urine output, fevers, chills, vomiting or other concerning symptoms.

## 2024-12-17 NOTE — DISCHARGE SUMMARY
Discharge Summary    Eunice Sutherland  :  1955  MRN:  [id number]    Admit date:  12/15/2024  Discharge date:  2024    Admitting Physician:    No admitting provider for patient encounter.    Discharge Diagnoses:    Principal Problem:    Kidney stone  Active Problems:    Primary hypertension    History of Nissen fundoplication    Hiatal hernia    DVT (deep venous thrombosis) ([facility])  Resolved Problems:    * No resolved hospital problems. *    Consults:   IP CONSULT TO UROLOGY  IP CONSULT TO INTERNAL MEDICINE  IP CONSULT TO GENERAL SURGERY     Condition at Discharge:  Stable    HPI/Hospital Course: 69 y.o. female with recent abdominal surgeries at [facility] and findings of dvt on eliquis who presented to [facility] ED from home with abdominal pain.   Pt had a partial gastrectomy at [facility] and in the past few days developed abd pain along with nausea and hematuria.  W/u in the ED including CT A/P found no major new changes from recent abdominal but did find mild left hydroureteronephrosis with a 3 mm calculus so urology was consulted for possible cysto/stent placement.  Since arriving to ED last night, pts symptoms have resolved with no further abd pain, nausea or hematuria.  Pt was seen by urology and as pt was asymptomatic and she didn't want any procedure it was decided to dc pt on flomax with o/p urology f/u.    Physical Exam  /77   Pulse 90   Temp 98.2 °F (36.8 °C)   Resp 23   Ht 1.499 m (4' 11\")   Wt 43.1 kg (95 lb)   LMP  (LMP Unknown)   SpO2 96%   BMI 19.19 kg/m²   RRR   CTA bilaterally, no wheeze, rales or rhonchi   bowel sounds present, nontender, nondistended   No clubbing, cyanosis, or edema   Neuro - at baseline     Pertinent Results during this Hospital Course:  CT ABDOMEN PELVIS W IV CONTRAST Additional Contrast? Oral    Result Date: 2024  EXAMINATION: CT OF THE ABDOMEN AND PELVIS WITH CONTRAST 12/15/2024 11:17 pm TECHNIQUE: CT of the abdomen and pelvis was performed with the

## 2024-12-21 LAB
MICROORGANISM SPEC CULT: NORMAL
MICROORGANISM SPEC CULT: NORMAL
SERVICE CMNT-IMP: NORMAL
SERVICE CMNT-IMP: NORMAL
SPECIMEN DESCRIPTION: NORMAL
SPECIMEN DESCRIPTION: NORMAL

## 2025-01-25 LAB
LEFT VENTRICULAR EJECTION FRACTION HIGH VALUE: 27 %
LV EF: 27 %

## 2025-04-29 ENCOUNTER — TELEPHONE (OUTPATIENT)
Dept: OTHER | Age: 70
End: 2025-04-29

## 2025-04-29 DIAGNOSIS — I50.9 HEART FAILURE, UNSPECIFIED HF CHRONICITY, UNSPECIFIED HEART FAILURE TYPE (HCC): Primary | ICD-10-CM

## 2025-04-29 NOTE — TELEPHONE ENCOUNTER
Dr. Shi Novak from [facility] patient that would greatly benefit from being seen at [facility]. GDMT titration.     If you can please schedule the patient and you can put orders under me.       CHF Clinic order to be placed per Billie OCHOA.

## 2025-05-22 ENCOUNTER — RESULTS FOLLOW-UP (OUTPATIENT)
Age: 70
End: 2025-05-22

## 2025-05-22 ENCOUNTER — HOSPITAL ENCOUNTER (OUTPATIENT)
Dept: OTHER | Age: 70
Setting detail: THERAPIES SERIES
Discharge: HOME | End: 2025-05-22
Payer: COMMERCIAL

## 2025-05-22 VITALS
RESPIRATION RATE: 17 BRPM | WEIGHT: 110 LBS | SYSTOLIC BLOOD PRESSURE: 98 MMHG | DIASTOLIC BLOOD PRESSURE: 67 MMHG | BODY MASS INDEX: 22.22 KG/M2 | OXYGEN SATURATION: 96 % | HEART RATE: 82 BPM

## 2025-05-22 DIAGNOSIS — I50.20 HFREF (HEART FAILURE WITH REDUCED EJECTION FRACTION) (HCC): Primary | ICD-10-CM

## 2025-05-22 LAB
ANION GAP SERPL CALCULATED.3IONS-SCNC: 11 MMOL/L (ref 7–16)
BNP SERPL-MCNC: 594 PG/ML (ref 0–450)
BUN SERPL-MCNC: 11 MG/DL (ref 6–23)
CALCIUM SERPL-MCNC: 9.8 MG/DL (ref 8.6–10.2)
CHLORIDE SERPL-SCNC: 101 MMOL/L (ref 98–107)
CO2 SERPL-SCNC: 24 MMOL/L (ref 22–29)
CREAT SERPL-MCNC: 0.7 MG/DL (ref 0.5–1)
GFR, ESTIMATED: 87 ML/MIN/1.73M2
GLUCOSE SERPL-MCNC: 102 MG/DL (ref 74–99)
POTASSIUM SERPL-SCNC: 3.6 MMOL/L (ref 3.5–5)
SODIUM SERPL-SCNC: 136 MMOL/L (ref 132–146)

## 2025-05-22 PROCEDURE — 99214 OFFICE O/P EST MOD 30 MIN: CPT

## 2025-05-22 PROCEDURE — 36415 COLL VENOUS BLD VENIPUNCTURE: CPT

## 2025-05-22 PROCEDURE — 80048 BASIC METABOLIC PNL TOTAL CA: CPT

## 2025-05-22 PROCEDURE — 83880 ASSAY OF NATRIURETIC PEPTIDE: CPT

## 2025-05-22 RX ORDER — ROSUVASTATIN CALCIUM 20 MG/1
20 TABLET, COATED ORAL DAILY
COMMUNITY

## 2025-05-22 RX ORDER — SACUBITRIL AND VALSARTAN 24; 26 MG/1; MG/1
1 TABLET, FILM COATED ORAL 2 TIMES DAILY
COMMUNITY

## 2025-05-22 RX ORDER — OMEPRAZOLE 20 MG/1
20 CAPSULE, DELAYED RELEASE ORAL DAILY
COMMUNITY

## 2025-05-22 RX ORDER — ACETAMINOPHEN 325 MG/1
650 TABLET ORAL EVERY 6 HOURS PRN
COMMUNITY

## 2025-05-22 RX ORDER — ASPIRIN 81 MG/1
81 TABLET ORAL DAILY
COMMUNITY

## 2025-05-22 RX ORDER — METOPROLOL SUCCINATE 50 MG/1
50 TABLET, EXTENDED RELEASE ORAL 2 TIMES DAILY
Qty: 60 TABLET | Refills: 3 | Status: SHIPPED | OUTPATIENT
Start: 2025-05-22

## 2025-05-22 RX ORDER — ALBUTEROL SULFATE 90 UG/1
2 INHALANT RESPIRATORY (INHALATION) EVERY 6 HOURS PRN
COMMUNITY

## 2025-05-22 RX ORDER — ACETAMINOPHEN/DIPHENHYDRAMINE 500MG-25MG
1 TABLET ORAL NIGHTLY PRN
COMMUNITY

## 2025-05-22 RX ORDER — SPIRONOLACTONE 25 MG/1
25 TABLET ORAL DAILY
COMMUNITY

## 2025-05-22 RX ORDER — METOPROLOL SUCCINATE 50 MG/1
50 TABLET, EXTENDED RELEASE ORAL DAILY
COMMUNITY
End: 2025-05-22 | Stop reason: SDUPTHER

## 2025-05-22 ASSESSMENT — PATIENT HEALTH QUESTIONNAIRE - PHQ9
2. FEELING DOWN, DEPRESSED OR HOPELESS: SEVERAL DAYS
SUM OF ALL RESPONSES TO PHQ QUESTIONS 1-9: 2
1. LITTLE INTEREST OR PLEASURE IN DOING THINGS: SEVERAL DAYS
SUM OF ALL RESPONSES TO PHQ QUESTIONS 1-9: 2

## 2025-05-22 NOTE — PROGRESS NOTES
Congestive Heart Failure Clinic   [facility]      Referring Provider: Dr. Shi Novak [facility]  Primary Care Physician: Antonio Cole MD   Cardiologist: Shi Novak   Nephrologist: None       HISTORY OF PRESENT ILLNESS:     Eunice Sutherland is a 69 y.o. (1955) female with a history of HFrEF (EF < 40%)  Pre Cupid:   No results found for: \"LVEF\"  Post Cupid:  No results found for: \"EFBP\"      She presents to the CHF clinic for ongoing evaluation and monitoring of heart failure.    In the CHF clinic today she denies any adverse symptoms except:  [] Dizziness or lightheadedness   [] Syncope or near syncope  [] Recent Fall  [] Shortness of breath at rest   [x] Dyspnea with exertion- stable   [x] Decline in functional capacity (unable to perform activities they had previously been able to do)  [] Fatigue   [] Orthopnea  [] PND  [] Nocturnal cough  [] Hemoptysis  [] Chest pain, pressure, or discomfort  [] Palpitations  [] Abdominal distention  [] Abdominal bloating  [] Early satiety  [] Blood in stool   [] Diarrhea  [] Constipation  [] Nausea/Vomiting  [] Decreased urinary response to oral diuretic   [] Scrotal swelling   [] Lower extremity edema  [] Used PRN doses of oral diuretic   [] Weight gain  Patient denies any current symptoms     Wt Readings from Last 3 Encounters:   05/22/25 49.9 kg (110 lb)   12/16/24 43.1 kg (95 lb)   10/05/23 63.5 kg (140 lb)           SOCIAL HISTORY:  [x] Denies tobacco, alcohol or illicit drug abuse  [] Tobacco use:  [] ETOH use:  [] Illicit drug use:        MEDICATIONS:    Allergies   Allergen Reactions    Molds & Smuts Swelling    Meperidine Nausea And Vomiting     Prior to Visit Medications    Medication Sig Taking? Authorizing Provider   empagliflozin (JARDIANCE) 10 MG tablet Take 1 tablet by mouth daily Yes Provider, MD Nick   omeprazole (PRILOSEC) 20 MG delayed release capsule Take 1 capsule by mouth daily Yes

## 2025-05-22 NOTE — RESULT ENCOUNTER NOTE
Labs and CHF Clinic note reviewed    Wt Readings from Last 3 Encounters:  12/16/24 : 43.1 kg (95 lb)  10/05/23 : 63.5 kg (140 lb)  09/18/23 : 66.4 kg (146 lb 6.4 oz)    BP Readings from Last 1 Encounters:  12/16/24 : 129/77    Pulse Readings from Last 1 Encounters:  12/16/24 : 90      Current GDMT:  ARNI/ACE I/ARB: Entresto 24/26 mg BID  Hydralazine/Nitrates : No  Beta blocker: Metoprolol Succinate (Toprol) 50 mg in AM and 25 mg nightly  Aldosterone antagonist: Spironolactone 25 mg daily  SGLT2i: Jardiance 10 mg daily  Diuretic: No  Supplemental Potassium: No      Increase Toprol to 50 mg BID  Referral to cardiac rehab (Per Dr. Novak)  Follow up in CHF clinic as scheduled

## 2025-06-09 ENCOUNTER — HOSPITAL ENCOUNTER (OUTPATIENT)
Dept: CARDIAC REHAB | Age: 70
Setting detail: THERAPIES SERIES
Discharge: HOME | End: 2025-06-09
Payer: COMMERCIAL

## 2025-06-09 PROCEDURE — 93798 PHYS/QHP OP CAR RHAB W/ECG: CPT

## 2025-06-09 PROCEDURE — 93797 PHYS/QHP OP CAR RHAB WO ECG: CPT

## 2025-06-09 ASSESSMENT — PATIENT HEALTH QUESTIONNAIRE - PHQ9
2. FEELING DOWN, DEPRESSED OR HOPELESS: NOT AT ALL
4. FEELING TIRED OR HAVING LITTLE ENERGY: SEVERAL DAYS
1. LITTLE INTEREST OR PLEASURE IN DOING THINGS: NOT AT ALL
9. THOUGHTS THAT YOU WOULD BE BETTER OFF DEAD, OR OF HURTING YOURSELF: NOT AT ALL
10. IF YOU CHECKED OFF ANY PROBLEMS, HOW DIFFICULT HAVE THESE PROBLEMS MADE IT FOR YOU TO DO YOUR WORK, TAKE CARE OF THINGS AT HOME, OR GET ALONG WITH OTHER PEOPLE: NOT DIFFICULT AT ALL
SUM OF ALL RESPONSES TO PHQ QUESTIONS 1-9: 1
6. FEELING BAD ABOUT YOURSELF - OR THAT YOU ARE A FAILURE OR HAVE LET YOURSELF OR YOUR FAMILY DOWN: NOT AT ALL
SUM OF ALL RESPONSES TO PHQ QUESTIONS 1-9: 1
5. POOR APPETITE OR OVEREATING: NOT AT ALL
8. MOVING OR SPEAKING SO SLOWLY THAT OTHER PEOPLE COULD HAVE NOTICED. OR THE OPPOSITE, BEING SO FIGETY OR RESTLESS THAT YOU HAVE BEEN MOVING AROUND A LOT MORE THAN USUAL: NOT AT ALL
SUM OF ALL RESPONSES TO PHQ QUESTIONS 1-9: 1
3. TROUBLE FALLING OR STAYING ASLEEP: NOT AT ALL
SUM OF ALL RESPONSES TO PHQ QUESTIONS 1-9: 1
7. TROUBLE CONCENTRATING ON THINGS, SUCH AS READING THE NEWSPAPER OR WATCHING TELEVISION: NOT AT ALL

## 2025-06-09 ASSESSMENT — NEW YORK HEART ASSOCIATION (NYHA) CLASSIFICATION: NYHA FUNCTIONAL CLASS: CLASS I

## 2025-06-10 NOTE — PROGRESS NOTES
06/09/25 0930   Treatment Diagnosis   Treatment Diagnosis 1 HF   HF Date 03/06/25   Referral Date 05/22/25   Significant Cardiovascular History Previous myocardial infarction   NYHA Heart Failure Classification Class I  (Per doctor notes in chart.)   Co-morbidities Pulmonary disease   Individual Treatment Plan   ITP Visit Type Initial assessment   ITP Next Review Date 07/01/25   Visit #/Total Visits 2/36   EF% 27 %   Risk Stratification High   Supplemental Oxygen   Oxygen Use No   Exercise Assessment   Stages of Change Preparation   DASI Total Score 25.95   Assisted Devices None   Test Six minute walk test   Data Measured Before Test   Heart Rate 60   Resting Blood Pressure 108/62   SpO2 96   O2 Device Room air   RPD 0   Data Measured During Test   Indicate Mode of RPE 3 minutes   Modality Track   Track Distance 30 M   METS 2   Symptoms   (PT denies)   Problems While Exercising None   Blood Pressure 134/70   Lowest SpO2 95 %   O2 Device Room air   RPD 0   RPE 9   Data Measured at Peak   Distance Calculated in  m   Distance (meters) 315 meters   Peak Heart Rate 114   Peak Blood Pressure 134/70   Peak RPE 9   SpO2 95   Data Measured at 5 Minutes After Test   Heart Rate 65   Blood Pressure 116/68   RPD 0   SpO2 97 %   O2 Device Room air   Comments Patient tolerated 6MWT well without any stops/breaks or rests.   Exercise Intervention/Prescription   Mode Treadmill;Bike;Stepper;Ergometer;Stairs   Frequency per week 2-3x/week   Duration Per Session 30 minutes to start   Intensity METS       2-3   Target RPE 9-14   Progression Patient to slowly increase time and intensities as tolerated.  (PT EF% = 27%)   Symptoms with Exercise Denies   Target Heart Rate   ( (-0%) EF% =27% Pt is to start an increase of metoprolol. Once med started THRR will need adjusted.)   Resistance Training No   Exercise Activity at Home   Type none at the moment   Exercise Education   Education Equipment orientation;Exercise

## 2025-06-11 ENCOUNTER — RESULTS FOLLOW-UP (OUTPATIENT)
Age: 70
End: 2025-06-11

## 2025-06-11 ENCOUNTER — TELEPHONE (OUTPATIENT)
Dept: OTHER | Age: 70
End: 2025-06-11

## 2025-06-11 ENCOUNTER — HOSPITAL ENCOUNTER (OUTPATIENT)
Dept: OTHER | Age: 70
Setting detail: THERAPIES SERIES
Discharge: HOME | End: 2025-06-11
Payer: COMMERCIAL

## 2025-06-11 VITALS
DIASTOLIC BLOOD PRESSURE: 68 MMHG | HEART RATE: 76 BPM | WEIGHT: 114.04 LBS | SYSTOLIC BLOOD PRESSURE: 113 MMHG | OXYGEN SATURATION: 99 % | RESPIRATION RATE: 18 BRPM | BODY MASS INDEX: 23.03 KG/M2

## 2025-06-11 DIAGNOSIS — I50.20 HFREF (HEART FAILURE WITH REDUCED EJECTION FRACTION) (HCC): Primary | ICD-10-CM

## 2025-06-11 LAB
ANION GAP SERPL CALCULATED.3IONS-SCNC: 11 MMOL/L (ref 7–16)
BNP SERPL-MCNC: 509 PG/ML (ref 0–125)
BUN SERPL-MCNC: 13 MG/DL (ref 8–23)
CALCIUM SERPL-MCNC: 8.6 MG/DL (ref 8.8–10.2)
CHLORIDE SERPL-SCNC: 106 MMOL/L (ref 98–107)
CO2 SERPL-SCNC: 21 MMOL/L (ref 22–29)
CREAT SERPL-MCNC: 0.6 MG/DL (ref 0.5–1)
GFR, ESTIMATED: >90 ML/MIN/1.73M2
GLUCOSE SERPL-MCNC: 103 MG/DL (ref 74–99)
POTASSIUM SERPL-SCNC: 3.5 MMOL/L (ref 3.5–5.1)
SODIUM SERPL-SCNC: 138 MMOL/L (ref 136–145)

## 2025-06-11 PROCEDURE — 6360000002 HC RX W HCPCS: Performed by: NURSE PRACTITIONER

## 2025-06-11 PROCEDURE — 36415 COLL VENOUS BLD VENIPUNCTURE: CPT

## 2025-06-11 PROCEDURE — 2500000003 HC RX 250 WO HCPCS: Performed by: NURSE PRACTITIONER

## 2025-06-11 PROCEDURE — 80048 BASIC METABOLIC PNL TOTAL CA: CPT

## 2025-06-11 PROCEDURE — 99214 OFFICE O/P EST MOD 30 MIN: CPT

## 2025-06-11 PROCEDURE — 96374 THER/PROPH/DIAG INJ IV PUSH: CPT

## 2025-06-11 PROCEDURE — 83880 ASSAY OF NATRIURETIC PEPTIDE: CPT

## 2025-06-11 RX ORDER — FUROSEMIDE 10 MG/ML
40 INJECTION INTRAMUSCULAR; INTRAVENOUS ONCE
Status: COMPLETED | OUTPATIENT
Start: 2025-06-11 | End: 2025-06-11

## 2025-06-11 RX ORDER — SODIUM CHLORIDE 0.9 % (FLUSH) 0.9 %
5-40 SYRINGE (ML) INJECTION ONCE
Status: COMPLETED | OUTPATIENT
Start: 2025-06-11 | End: 2025-06-11

## 2025-06-11 RX ADMIN — SODIUM CHLORIDE, PRESERVATIVE FREE 10 ML: 5 INJECTION INTRAVENOUS at 09:32

## 2025-06-11 RX ADMIN — FUROSEMIDE 40 MG: 10 INJECTION, SOLUTION INTRAMUSCULAR; INTRAVENOUS at 09:31

## 2025-06-11 NOTE — RESULT ENCOUNTER NOTE
Labs and CHF Clinic note reviewed    Wt Readings from Last 3 Encounters:  06/11/25 : 51.7 kg (114 lb 0.6 oz)  05/22/25 : 49.9 kg (110 lb)  12/16/24 : 43.1 kg (95 lb)    BP Readings from Last 1 Encounters:  06/11/25 : 113/68    Pulse Readings from Last 1 Encounters:  06/11/25 : 76      Current GDMT:  ARNI/ACE I/ARB: Entresto 24/26 mg BID  Hydralazine/Nitrates : No  Beta blocker: Metoprolol Succinate (Toprol) 50 mg BID  Aldosterone antagonist: Spironolactone 25 mg daily  SGLT2i: Jardiance 10 mg daily  Diuretic: No  Supplemental Potassium: No      Increase Entresto 49/51 mg BID  Follow up BMP in 1 week   Follow up in CHF clinic as scheduled     Thank you

## 2025-06-11 NOTE — PLAN OF CARE
Problem: Chronic Conditions and Co-morbidities  Goal: Patient's chronic conditions and co-morbidity symptoms are monitored and maintained or improved  6/11/2025 1111 by Pinky Pak RN  Outcome: Progressing  6/11/2025 1110 by Pinky Pak RN  Outcome: Progressing     Problem: Chronic Conditions and Co-morbidities  Goal: Patient's chronic conditions and co-morbidity symptoms are monitored and maintained or improved  6/11/2025 1111 by Pinky Pak RN  Outcome: Progressing  6/11/2025 1110 by Pinky Pak RN  Outcome: Progressing

## 2025-06-11 NOTE — PROGRESS NOTES
21 (L)   05/22/2025 24   12/15/2024 27     BUN (mg/dL)   Date Value   06/11/2025 13   05/22/2025 11   12/15/2024 12     Creatinine (mg/dL)   Date Value   06/11/2025 0.6   05/22/2025 0.7   12/15/2024 0.7     Glucose (mg/dL)   Date Value   06/11/2025 103 (H)   05/22/2025 102 (H)   12/15/2024 118 (H)     Calcium (mg/dL)   Date Value   06/11/2025 8.6 (L)   05/22/2025 9.8   12/15/2024 8.7     BNP:  NT Pro-BNP (pg/mL)   Date Value   06/11/2025 509 (H)   05/22/2025 594 (H)      CBC:  WBC (k/uL)   Date Value   12/15/2024 10.6     Hemoglobin (g/dL)   Date Value   12/15/2024 14.5     Hematocrit (%)   Date Value   12/15/2024 46.1     Platelets (k/uL)   Date Value   12/15/2024 432     Iron Studies:  No results found for: \"FERRITIN\", \"IRON\", \"TIBC\"  Hepatic:  AST (U/L)   Date Value   12/15/2024 14     ALT (U/L)   Date Value   12/15/2024 9     Total Bilirubin (mg/dL)   Date Value   12/15/2024 0.8     Alkaline Phosphatase (U/L)   Date Value   12/15/2024 210 (H)     INR:  No results found for: \"INR\"      Wt Readings from Last 3 Encounters:   06/11/25 51.7 kg (114 lb 0.6 oz)   05/22/25 49.9 kg (110 lb)   12/16/24 43.1 kg (95 lb)           ASSESSMENT/PLAN:    [] Euvolemic          [x] Hypervolemic, with increase from baseline:  [x] Shortness of breath/FUNES  [] JVD  [] HJR  [] Abnormal lung assessment:   [] Orthopnea  [] PND  [] Decreased urinary response to oral diuretic   [] Scrotal swelling   [x] Lower extremity edema  [] Compression stockings provided  [] Decline in functional capacity (unable to perform activities they had previously been able to do)  [x] Weight gain     [x] IV diuretics given IV LASIX 40 mg  [] Provider notified of recurrent IV diuretic use    Additional Notes: Pt has been referred to us from her [facility] cardiologist Shi Novak to assist with GDMT.  She is hoping to establish with a local cardiologist but does not have a referral. States that her cardiologist at [facility] will be sending a referral to Dr. Velazquez and

## 2025-06-11 NOTE — TELEPHONE ENCOUNTER
----- Message from GUSTAVO Degroot CNP sent at 6/11/2025  3:46 PM EDT -----  Regarding: RE: medications  I always try to stay away from supplements while we are titrating -- if she were to get side effects then we aren't sure what they are from versus knowing if we need to adjust GDMT.     She can also discuss with her PCP  ----- Message -----  From: Pinky Pak RN  Sent: 6/11/2025   2:50 PM EDT  To: GUSTAVO Garzon CNP  Subject: medications                                      Eunice Sutherland 1955     Current GDMT titration patient. Pt is interested in restarting her beets supplement and Prevagen, allegra. Afraid interfere with cardiac medications.  Is it ok to restart or should defer to PCP?

## 2025-06-11 NOTE — TELEPHONE ENCOUNTER
3:52 PM 6/11/2025 Called Rosmery re: recommendations about her supplements. I have reviewed the provider's instructions with the patient, answering all questions to her satisfaction.      Future Appointments   Date Time Provider Department Center   6/24/2025  9:00 AM [facility] CARDIAC REHAB EXERCISE [facility] [place]   6/26/2025  9:00 AM [facility] CARDIAC REHAB EXERCISE [facility] [place]   7/1/2025  8:15 AM [facility] CHF ROOM 2 [facility] [place]   7/1/2025  9:00 AM [facility] CARDIAC REHAB EXERCISE [facility] [place]   7/3/2025  9:00 AM [facility] CARDIAC REHAB EXERCISE [facility] [place]   7/8/2025  9:00 AM [facility] CARDIAC REHAB EXERCISE [facility] [place]   7/10/2025  9:00 AM [facility] CARDIAC REHAB EXERCISE [facility] [place]   7/15/2025  9:00 AM [facility] CARDIAC REHAB EXERCISE [facility] [place]   7/17/2025  9:00 AM [facility] CARDIAC REHAB EXERCISE [facility] [place]   7/22/2025  9:00 AM [facility] CARDIAC REHAB EXERCISE [facility] [place]   7/24/2025  9:00 AM [facility] CARDIAC REHAB EXERCISE [facility] [place]   7/29/2025  9:00 AM [facility] CARDIAC REHAB EXERCISE [facility] [place]   7/31/2025  9:00 AM [facility] CARDIAC REHAB EXERCISE [facility] [place]   8/5/2025  9:00 AM [facility] CARDIAC REHAB EXERCISE [facility] [place]   8/7/2025  9:00 AM [facility] CARDIAC REHAB EXERCISE [facility] [place]   8/12/2025  9:00 AM [facility] CARDIAC REHAB EXERCISE [facility] [place]   8/14/2025  9:00 AM [facility] CARDIAC REHAB EXERCISE [facility] [place]   8/19/2025  9:00 AM [facility] CARDIAC REHAB EXERCISE [facility] [place]   8/21/2025  9:00 AM [facility] CARDIAC REHAB EXERCISE [facility] [place]   8/26/2025  9:00 AM [facility] CARDIAC REHAB EXERCISE [facility] [place]   8/28/2025  9:00 AM [facility] CARDIAC REHAB EXERCISE [facility] [place]   9/2/2025  9:00 AM [facility] CARDIAC REHAB EXERCISE [facility] [place]   9/4/2025  9:00 AM [facility] CARDIAC REHAB EXERCISE [facility] [place]   9/9/2025  9:00 AM [facility] CARDIAC REHAB EXERCISE

## 2025-06-13 NOTE — TELEPHONE ENCOUNTER
7:25 AM 6/13/2025 Called patient re: new medication changes. I have reviewed the provider's instructions with the patient, answering all questions to her satisfaction.      ----- Message from GUSTAVO Degroot CNP sent at 6/13/2025  7:22 AM EDT -----  Proceed with Toprol titration and hold on Entresto until next check  ----- Message -----  From: Pinky Pak RN  Sent: 6/13/2025   6:47 AM EDT  To: GUSTAVO Garzon CNP      Pt states she did NOT start the increase dose of the metoprolol succinate 50 mg BID d/t to being out of the town. Pt states she is going out of the town this week and when she comes back home on Saturday she will start the increase dose as prescribed. Currently taking Metoprolol succinate 50mg in the AM and 25mg in he evening. Do you want her to take increase dose of metoprolol and start the entresto? Just wanted to make sure prior to calling her. Thank you!  ----- Message -----  From: Billie Vernon APRN - CNP  Sent: 6/11/2025   3:57 PM EDT  To: Pinky Pak RN    Labs and CHF Clinic note reviewed    Wt Readings from Last 3 Encounters:  06/11/25 : 51.7 kg (114 lb 0.6 oz)  05/22/25 : 49.9 kg (110 lb)  12/16/24 : 43.1 kg (95 lb)    BP Readings from Last 1 Encounters:  06/11/25 : 113/68    Pulse Readings from Last 1 Encounters:  06/11/25 : 76       Current GDMT:  ARNI/ACE I/ARB: Entresto 24/26 mg BID  Hydralazine/Nitrates : No  Beta blocker: Metoprolol Succinate (Toprol) 50 mg BID  Aldosterone antagonist: Spironolactone 25 mg daily  SGLT2i: Jardiance 10 mg daily  Diuretic: No  Supplemental Potassium: No      Increase Entresto 49/51 mg BID  Follow up BMP in 1 week   Follow up in CHF clinic as scheduled     Thank you

## 2025-06-24 ENCOUNTER — HOSPITAL ENCOUNTER (OUTPATIENT)
Dept: CARDIAC REHAB | Age: 70
Setting detail: THERAPIES SERIES
Discharge: HOME | End: 2025-06-24
Payer: COMMERCIAL

## 2025-06-24 PROCEDURE — 93798 PHYS/QHP OP CAR RHAB W/ECG: CPT

## 2025-06-26 ENCOUNTER — HOSPITAL ENCOUNTER (OUTPATIENT)
Dept: CARDIAC REHAB | Age: 70
Setting detail: THERAPIES SERIES
Discharge: HOME | End: 2025-06-26
Payer: COMMERCIAL

## 2025-06-26 PROCEDURE — 93798 PHYS/QHP OP CAR RHAB W/ECG: CPT

## 2025-07-01 ENCOUNTER — HOSPITAL ENCOUNTER (OUTPATIENT)
Dept: CARDIAC REHAB | Age: 70
Setting detail: THERAPIES SERIES
Discharge: HOME | End: 2025-07-01
Payer: COMMERCIAL

## 2025-07-01 ENCOUNTER — HOSPITAL ENCOUNTER (OUTPATIENT)
Dept: OTHER | Age: 70
Setting detail: THERAPIES SERIES
Discharge: HOME | End: 2025-07-01
Payer: COMMERCIAL

## 2025-07-01 VITALS
SYSTOLIC BLOOD PRESSURE: 98 MMHG | RESPIRATION RATE: 16 BRPM | BODY MASS INDEX: 22.22 KG/M2 | WEIGHT: 110 LBS | DIASTOLIC BLOOD PRESSURE: 60 MMHG | OXYGEN SATURATION: 98 % | HEART RATE: 80 BPM

## 2025-07-01 LAB
ANION GAP SERPL CALCULATED.3IONS-SCNC: 8 MMOL/L (ref 7–16)
BNP SERPL-MCNC: 522 PG/ML (ref 0–125)
BUN SERPL-MCNC: 20 MG/DL (ref 8–23)
CALCIUM SERPL-MCNC: 8.9 MG/DL (ref 8.8–10.2)
CHLORIDE SERPL-SCNC: 104 MMOL/L (ref 98–107)
CO2 SERPL-SCNC: 25 MMOL/L (ref 22–29)
CREAT SERPL-MCNC: 0.8 MG/DL (ref 0.5–1)
GFR, ESTIMATED: 77 ML/MIN/1.73M2
GLUCOSE SERPL-MCNC: 121 MG/DL (ref 74–99)
POTASSIUM SERPL-SCNC: 4.2 MMOL/L (ref 3.5–5.1)
SODIUM SERPL-SCNC: 136 MMOL/L (ref 136–145)

## 2025-07-01 PROCEDURE — 93798 PHYS/QHP OP CAR RHAB W/ECG: CPT

## 2025-07-01 PROCEDURE — 83880 ASSAY OF NATRIURETIC PEPTIDE: CPT

## 2025-07-01 PROCEDURE — 80048 BASIC METABOLIC PNL TOTAL CA: CPT

## 2025-07-01 PROCEDURE — 99214 OFFICE O/P EST MOD 30 MIN: CPT

## 2025-07-01 PROCEDURE — 36415 COLL VENOUS BLD VENIPUNCTURE: CPT

## 2025-07-01 ASSESSMENT — PATIENT HEALTH QUESTIONNAIRE - PHQ9
3. TROUBLE FALLING OR STAYING ASLEEP: NOT AT ALL
4. FEELING TIRED OR HAVING LITTLE ENERGY: SEVERAL DAYS
6. FEELING BAD ABOUT YOURSELF - OR THAT YOU ARE A FAILURE OR HAVE LET YOURSELF OR YOUR FAMILY DOWN: NOT AT ALL
SUM OF ALL RESPONSES TO PHQ QUESTIONS 1-9: 3
1. LITTLE INTEREST OR PLEASURE IN DOING THINGS: NOT AT ALL
8. MOVING OR SPEAKING SO SLOWLY THAT OTHER PEOPLE COULD HAVE NOTICED. OR THE OPPOSITE, BEING SO FIGETY OR RESTLESS THAT YOU HAVE BEEN MOVING AROUND A LOT MORE THAN USUAL: NOT AT ALL
10. IF YOU CHECKED OFF ANY PROBLEMS, HOW DIFFICULT HAVE THESE PROBLEMS MADE IT FOR YOU TO DO YOUR WORK, TAKE CARE OF THINGS AT HOME, OR GET ALONG WITH OTHER PEOPLE: NOT DIFFICULT AT ALL
5. POOR APPETITE OR OVEREATING: MORE THAN HALF THE DAYS
SUM OF ALL RESPONSES TO PHQ QUESTIONS 1-9: 3
SUM OF ALL RESPONSES TO PHQ QUESTIONS 1-9: 3
2. FEELING DOWN, DEPRESSED OR HOPELESS: NOT AT ALL
7. TROUBLE CONCENTRATING ON THINGS, SUCH AS READING THE NEWSPAPER OR WATCHING TELEVISION: NOT AT ALL
9. THOUGHTS THAT YOU WOULD BE BETTER OFF DEAD, OR OF HURTING YOURSELF: NOT AT ALL
SUM OF ALL RESPONSES TO PHQ QUESTIONS 1-9: 3

## 2025-07-01 ASSESSMENT — NEW YORK HEART ASSOCIATION (NYHA) CLASSIFICATION: NYHA FUNCTIONAL CLASS: CLASS I

## 2025-07-01 NOTE — PROGRESS NOTES
affordability N/A          Scheduled to follow up in CHF clinic on:   Future Appointments   Date Time Provider Department Center   7/3/2025  9:00 AM [facility] CARDIAC REHAB EXERCISE [facility] [place]   7/8/2025  9:00 AM [facility] CARDIAC REHAB EXERCISE [facility] [place]   7/10/2025  9:00 AM [facility]ARDIAC REHAB EXERCISE [facility] [place]   7/15/2025  9:00 AM [facility] CARDIAC REHAB EXERCISE [facility] [place]   7/17/2025  9:00 AM [facility] CARDIAC REHAB EXERCISE [facility] [place]   7/22/2025  9:00 AM [facility]ARDIAC REHAB EXERCISE [facility] [place]   7/24/2025  9:00 AM [facility] CARDIAC REHAB EXERCISE [facility] [place]   7/24/2025 10:00 AM SCHEDULE, [facility] NUTRITION ROOM [facility] [place]   7/29/2025  9:00 AM [facility] CARDIAC REHAB EXERCISE [facility] [place]   7/31/2025  9:00 AM [facility] CARDIAC REHAB EXERCISE [facility] [place]   8/5/2025  8:15 AM [facility] CHF ROOM 3 [facility] [place]   8/5/2025  9:00 AM [facility] CARDIAC REHAB EXERCISE [facility] [place]   8/7/2025  9:00 AM [facility] CARDIAC REHAB EXERCISE [facility] [place]   8/12/2025  9:00 AM [facility] CARDIAC REHAB EXERCISE [facility] [place]   8/14/2025  9:00 AM [facility] CARDIAC REHAB EXERCISE [facility] [place]   8/19/2025  9:00 AM [facility] CARDIAC REHAB EXERCISE [facility] [place]   8/21/2025  9:00 AM [facility] CARDIAC REHAB EXERCISE [facility] [place]   8/26/2025  9:00 AM [facility] CARDIAC REHAB EXERCISE [facility] [place]   8/28/2025  9:00 AM [facility] CARDIAC REHAB EXERCISE [facility] [place]   9/2/2025  9:00 AM [facility] CARDIAC REHAB EXERCISE [facility] [place]   9/4/2025  9:00 AM [facility] CARDIAC REHAB EXERCISE [facility] [place]   9/9/2025  9:00 AM [facility] CARDIAC REHAB EXERCISE [facility] [place]   9/11/2025  9:00 AM [facility] CARDIAC REHAB EXERCISE [facility] [place]   9/16/2025  9:00 AM [facility] CARDIAC REHAB EXERCISE [facility] [place]   9/18/2025  9:00 AM [facility] CARDIAC REHAB EXERCISE [facility] [place]   9/23/2025

## 2025-07-01 NOTE — PLAN OF CARE
Problem: Chronic Conditions and Co-morbidities  Goal: Patient's chronic conditions and co-morbidity symptoms are monitored and maintained or improved  7/1/2025 0838 by Ana Moreira, RN  Outcome: Progressing  7/1/2025 0818 by Pinky Pak, RN  Outcome: Progressing

## 2025-07-02 ENCOUNTER — RESULTS FOLLOW-UP (OUTPATIENT)
Age: 70
End: 2025-07-02

## 2025-07-03 ENCOUNTER — HOSPITAL ENCOUNTER (OUTPATIENT)
Dept: CARDIAC REHAB | Age: 70
Setting detail: THERAPIES SERIES
Discharge: HOME | End: 2025-07-03
Payer: COMMERCIAL

## 2025-07-03 PROCEDURE — 93798 PHYS/QHP OP CAR RHAB W/ECG: CPT

## 2025-07-03 NOTE — PROGRESS NOTES
07/01/25 0830   Treatment Diagnosis   Treatment Diagnosis 1 HF   HF Date 03/06/25   Referral Date 05/22/25   Significant Cardiovascular History Previous myocardial infarction   NYHA Heart Failure Classification Class I  (Per doctor notes in chart.)   Co-morbidities Pulmonary disease   Individual Treatment Plan   ITP Visit Type Re-assessment   ITP Next Review Date 07/22/25   Visit #/Total Visits 5/36   EF% 27 %  (Echo 1/31/2025; Pt does not have a LIFEVEST or ICD.)   Risk Stratification High   Supplemental Oxygen   Oxygen Use No   Exercise Assessment   Stages of Change Preparation   Assisted Devices None   Test Reassessed patient using current exercise prescription   Data Measured Before Test   Heart Rate 71   Resting Blood Pressure 108/58   Data Measured During Test   Blood Pressure 106/60   Data Measured at Peak   Peak Heart Rate 88   Peak Blood Pressure 108/58   Data Measured at 5 Minutes After Test   Heart Rate 66   Blood Pressure 104/62   O2 Device Room air   Exercise Intervention/Prescription   Mode Treadmill;Bike;Stepper;Ergometer   Frequency per week 2-3x/week   Duration Per Session 35-40 minutes   Intensity METS       1.8-3   Target RPE 9-14   Progression Patient to slowly increase time and intensities as tolerated.  (PT EF% = 27%)   Symptoms with Exercise Denies   Target Heart Rate   ( (-0%) EF% =27% Pt is to start an increase of metoprolol. Once med started THRR will need adjusted.)   Resistance Training No   Exercise Activity at Home   Type none at the moment   Resistance Training No   Exercise Education   Education Equipment orientation;Exercise safety;Physically active;Pulse oximetry;RPE scale;Self pulse;Signs/symptoms to report;Warm up/cool down   Exercise Goals   Patient Target Goals Improve endurance;Individual exercise RX   Patient Treatment Goal Patient to improve overall aerobic endurance and strength by attending 2-3 sessions per week and working within a safe THRR while

## 2025-07-08 ENCOUNTER — APPOINTMENT (OUTPATIENT)
Dept: CARDIAC REHAB | Age: 70
End: 2025-07-08
Payer: COMMERCIAL

## 2025-07-09 ENCOUNTER — HOSPITAL ENCOUNTER (OUTPATIENT)
Dept: CARDIAC REHAB | Age: 70
Setting detail: THERAPIES SERIES
Discharge: HOME | End: 2025-07-09
Payer: COMMERCIAL

## 2025-07-09 PROCEDURE — 93798 PHYS/QHP OP CAR RHAB W/ECG: CPT

## 2025-07-10 ENCOUNTER — HOSPITAL ENCOUNTER (OUTPATIENT)
Dept: CARDIAC REHAB | Age: 70
Setting detail: THERAPIES SERIES
Discharge: HOME | End: 2025-07-10
Payer: COMMERCIAL

## 2025-07-10 PROCEDURE — 93798 PHYS/QHP OP CAR RHAB W/ECG: CPT

## 2025-07-15 ENCOUNTER — HOSPITAL ENCOUNTER (OUTPATIENT)
Dept: CARDIAC REHAB | Age: 70
Setting detail: THERAPIES SERIES
Discharge: HOME | End: 2025-07-15
Payer: COMMERCIAL

## 2025-07-15 PROCEDURE — 93798 PHYS/QHP OP CAR RHAB W/ECG: CPT

## 2025-07-17 ENCOUNTER — HOSPITAL ENCOUNTER (OUTPATIENT)
Dept: CARDIAC REHAB | Age: 70
Setting detail: THERAPIES SERIES
Discharge: HOME | End: 2025-07-17
Payer: COMMERCIAL

## 2025-07-17 PROCEDURE — 93798 PHYS/QHP OP CAR RHAB W/ECG: CPT

## 2025-07-22 ENCOUNTER — HOSPITAL ENCOUNTER (OUTPATIENT)
Dept: CARDIAC REHAB | Age: 70
Setting detail: THERAPIES SERIES
Discharge: HOME | End: 2025-07-22
Payer: COMMERCIAL

## 2025-07-22 PROCEDURE — 93798 PHYS/QHP OP CAR RHAB W/ECG: CPT

## 2025-07-22 ASSESSMENT — PATIENT HEALTH QUESTIONNAIRE - PHQ9
6. FEELING BAD ABOUT YOURSELF - OR THAT YOU ARE A FAILURE OR HAVE LET YOURSELF OR YOUR FAMILY DOWN: NOT AT ALL
5. POOR APPETITE OR OVEREATING: NOT AT ALL
SUM OF ALL RESPONSES TO PHQ QUESTIONS 1-9: 1
4. FEELING TIRED OR HAVING LITTLE ENERGY: SEVERAL DAYS
2. FEELING DOWN, DEPRESSED OR HOPELESS: NOT AT ALL
SUM OF ALL RESPONSES TO PHQ QUESTIONS 1-9: 1
9. THOUGHTS THAT YOU WOULD BE BETTER OFF DEAD, OR OF HURTING YOURSELF: NOT AT ALL
1. LITTLE INTEREST OR PLEASURE IN DOING THINGS: NOT AT ALL
3. TROUBLE FALLING OR STAYING ASLEEP: NOT AT ALL
8. MOVING OR SPEAKING SO SLOWLY THAT OTHER PEOPLE COULD HAVE NOTICED. OR THE OPPOSITE, BEING SO FIGETY OR RESTLESS THAT YOU HAVE BEEN MOVING AROUND A LOT MORE THAN USUAL: NOT AT ALL
10. IF YOU CHECKED OFF ANY PROBLEMS, HOW DIFFICULT HAVE THESE PROBLEMS MADE IT FOR YOU TO DO YOUR WORK, TAKE CARE OF THINGS AT HOME, OR GET ALONG WITH OTHER PEOPLE: NOT DIFFICULT AT ALL
7. TROUBLE CONCENTRATING ON THINGS, SUCH AS READING THE NEWSPAPER OR WATCHING TELEVISION: NOT AT ALL

## 2025-07-23 NOTE — PROGRESS NOTES
oximetry;RPE scale;Self pulse;Signs/symptoms to report;Warm up/cool down   Exercise Goals   Patient Target Goals Improve endurance;Individual exercise RX   Patient Treatment Goal Patient to improve overall aerobic endurance and strength by attending 2-3 sessions per week and working within a safe THRR while progressing to 60 minutes of continuous aerobic exercise.   Goal Status In progress   Progress Towards Goal Patient to continue to increase time on each modality as tolerated until 60 minutes of aerobic exercise is reached   Psychosocial Assessment   Stages of Change Maintenance  (PHQ9 score =1)   PHQ-9 Total Score 1   Psychosocial Intervention   Interventions No intervention indicated   Stress Management Techniques Connects with others;Other (comment)  (Pt is actively involved with her [employer] and [employer].)   Consults Other (comment)  (Not indicated)   Currently Taking Psychotropic Meds No   Medication Changes No   Psychosocial Education   Education Advanced directives;Benefits of CPR completion;Cardiac meds;Impact self care behaviors on health;Signs/symptoms of depression;Stress management class   Psychosocial Goals   Patient Target Goals Demonstrates appropriate interaction with others;Engages in self-care behaviors   Patient Treatment Goal Patient to continue to manage stressors with daily self care habits, diet and exercise.   Goal Status In progress   Progress Towards Goal Patient to implement one new self care habit by next 30 day ITP review.   Other Core Component - Tobacco Cessation   History of Tobacco/E-Cigarette/Chewing Tobacco/Other No   Current/Recent Use <6 mo. - Managed as Core Component No   Nutrition Assessment   Stages of Change Action  (Dietitian scheduled 7/24/2025)   Alcohol No   Height  1.499 m (4' 11\")   Weight  50.1 kg (110 lb 8 oz)   BMI 22.36   Other Core Component - Weight Management   BMI <18.5 or > 24.9, Managed as Core Component No   Other Core Component - Diabetes   Diabetes No

## 2025-07-24 ENCOUNTER — HOSPITAL ENCOUNTER (OUTPATIENT)
Dept: CARDIAC REHAB | Age: 70
Setting detail: THERAPIES SERIES
Discharge: HOME | End: 2025-07-24
Payer: COMMERCIAL

## 2025-07-24 PROCEDURE — 93798 PHYS/QHP OP CAR RHAB W/ECG: CPT

## 2025-07-29 ENCOUNTER — HOSPITAL ENCOUNTER (OUTPATIENT)
Dept: CARDIAC REHAB | Age: 70
Setting detail: THERAPIES SERIES
Discharge: HOME | End: 2025-07-29
Payer: COMMERCIAL

## 2025-07-29 PROCEDURE — 93798 PHYS/QHP OP CAR RHAB W/ECG: CPT

## 2025-07-31 ENCOUNTER — HOSPITAL ENCOUNTER (OUTPATIENT)
Dept: CARDIAC REHAB | Age: 70
Setting detail: THERAPIES SERIES
Discharge: HOME | End: 2025-07-31
Payer: COMMERCIAL

## 2025-07-31 PROCEDURE — 93798 PHYS/QHP OP CAR RHAB W/ECG: CPT

## 2025-08-05 ENCOUNTER — HOSPITAL ENCOUNTER (OUTPATIENT)
Dept: CARDIAC REHAB | Age: 70
Setting detail: THERAPIES SERIES
Discharge: HOME | End: 2025-08-05
Payer: COMMERCIAL

## 2025-08-05 ENCOUNTER — HOSPITAL ENCOUNTER (OUTPATIENT)
Dept: OTHER | Age: 70
Setting detail: THERAPIES SERIES
Discharge: HOME | End: 2025-08-05
Payer: COMMERCIAL

## 2025-08-05 ENCOUNTER — HOSPITAL ENCOUNTER (OUTPATIENT)
Dept: MAMMOGRAPHY | Age: 70
Discharge: HOME | End: 2025-08-07
Payer: COMMERCIAL

## 2025-08-05 VITALS
SYSTOLIC BLOOD PRESSURE: 90 MMHG | BODY MASS INDEX: 22.22 KG/M2 | HEART RATE: 68 BPM | DIASTOLIC BLOOD PRESSURE: 58 MMHG | RESPIRATION RATE: 17 BRPM | OXYGEN SATURATION: 98 % | WEIGHT: 110 LBS

## 2025-08-05 DIAGNOSIS — Z12.31 ENCOUNTER FOR MAMMOGRAM TO ESTABLISH BASELINE MAMMOGRAM: ICD-10-CM

## 2025-08-05 LAB
ANION GAP SERPL CALCULATED.3IONS-SCNC: 11 MMOL/L (ref 7–16)
BNP SERPL-MCNC: 408 PG/ML (ref 0–125)
BUN SERPL-MCNC: 15 MG/DL (ref 8–23)
CALCIUM SERPL-MCNC: 9.2 MG/DL (ref 8.8–10.2)
CHLORIDE SERPL-SCNC: 105 MMOL/L (ref 98–107)
CO2 SERPL-SCNC: 22 MMOL/L (ref 22–29)
CREAT SERPL-MCNC: 0.8 MG/DL (ref 0.5–1)
GFR, ESTIMATED: 76 ML/MIN/1.73M2
GLUCOSE SERPL-MCNC: 101 MG/DL (ref 74–99)
POTASSIUM SERPL-SCNC: 3.9 MMOL/L (ref 3.5–5.1)
SODIUM SERPL-SCNC: 138 MMOL/L (ref 136–145)

## 2025-08-05 PROCEDURE — 36415 COLL VENOUS BLD VENIPUNCTURE: CPT

## 2025-08-05 PROCEDURE — 77063 BREAST TOMOSYNTHESIS BI: CPT

## 2025-08-05 PROCEDURE — 80048 BASIC METABOLIC PNL TOTAL CA: CPT

## 2025-08-05 PROCEDURE — 93798 PHYS/QHP OP CAR RHAB W/ECG: CPT

## 2025-08-05 PROCEDURE — 99214 OFFICE O/P EST MOD 30 MIN: CPT

## 2025-08-05 PROCEDURE — 83880 ASSAY OF NATRIURETIC PEPTIDE: CPT

## 2025-08-05 RX ORDER — SACUBITRIL AND VALSARTAN 24; 26 MG/1; MG/1
1 TABLET, FILM COATED ORAL 2 TIMES DAILY
Qty: 60 TABLET | Refills: 3 | Status: SHIPPED | OUTPATIENT
Start: 2025-08-05

## 2025-08-07 ENCOUNTER — CLINICAL DOCUMENTATION (OUTPATIENT)
Dept: MAMMOGRAPHY | Age: 70
End: 2025-08-07

## 2025-08-07 ENCOUNTER — APPOINTMENT (OUTPATIENT)
Dept: CARDIAC REHAB | Age: 70
End: 2025-08-07
Payer: COMMERCIAL

## 2025-08-12 ENCOUNTER — APPOINTMENT (OUTPATIENT)
Dept: CARDIAC REHAB | Age: 70
End: 2025-08-12
Payer: COMMERCIAL

## 2025-08-14 ENCOUNTER — HOSPITAL ENCOUNTER (OUTPATIENT)
Dept: CARDIAC REHAB | Age: 70
Setting detail: THERAPIES SERIES
End: 2025-08-14
Payer: COMMERCIAL

## 2025-08-19 ENCOUNTER — HOSPITAL ENCOUNTER (OUTPATIENT)
Dept: CARDIAC REHAB | Age: 70
Setting detail: THERAPIES SERIES
End: 2025-08-19
Payer: COMMERCIAL

## 2025-08-19 ENCOUNTER — TELEPHONE (OUTPATIENT)
Dept: OTHER | Age: 70
End: 2025-08-19

## 2025-08-22 ENCOUNTER — HOSPITAL ENCOUNTER (OUTPATIENT)
Dept: CARDIAC REHAB | Age: 70
Setting detail: THERAPIES SERIES
Discharge: HOME | End: 2025-08-22
Payer: COMMERCIAL

## 2025-08-22 ASSESSMENT — NEW YORK HEART ASSOCIATION (NYHA) CLASSIFICATION: NYHA FUNCTIONAL CLASS: CLASS I

## 2025-08-26 ENCOUNTER — APPOINTMENT (OUTPATIENT)
Dept: CARDIAC REHAB | Age: 70
End: 2025-08-26
Payer: COMMERCIAL

## 2025-08-28 ENCOUNTER — APPOINTMENT (OUTPATIENT)
Dept: CARDIAC REHAB | Age: 70
End: 2025-08-28
Payer: COMMERCIAL

## (undated) DEVICE — GUIDEWIRE ENDOSCP L150CM DIA0.035IN TIP 3CM PTFE NIT

## (undated) DEVICE — WIPES SKIN CLOTH READYPREP 9 X 10.5 IN 2% CHLORHEX GLUCONATE CHG PREOP

## (undated) DEVICE — SOLUTION IV 1000ML 0.9% SOD CHL PH 5 INJ USP VIAFLX PLAS

## (undated) DEVICE — CYSTO PACK: Brand: MEDLINE INDUSTRIES, INC.

## (undated) DEVICE — TOWEL,OR,DSP,ST,BLUE,STD,6/PK,12PK/CS: Brand: MEDLINE

## (undated) DEVICE — GUIDEWIRE URO L150CM DIA0.035IN TAPR 3CM NIT HYDRPHLC ANG

## (undated) DEVICE — GARMENT,MEDLINE,DVT,INT,CALF,MED, GEN2: Brand: MEDLINE

## (undated) DEVICE — SOLUTION IRRIG 1000ML STRL H2O USP PLAS POUR BTL

## (undated) DEVICE — BAG DRNGE COMB PK

## (undated) DEVICE — TUBING, SUCTION, 1/4" X 10', STRAIGHT: Brand: MEDLINE

## (undated) DEVICE — GLOVE ORANGE PI 7 1/2   MSG9075

## (undated) DEVICE — GUIDEWIRE UROLOGICAL STR STD 0.035 IN X150 CM (QTY = BOX OF 5)

## (undated) DEVICE — BASIC SINGLE BASIN 1-LF: Brand: MEDLINE INDUSTRIES, INC.

## (undated) DEVICE — GOWN,SIRUS,NONRNF,SETINSLV,XL,20/CS: Brand: MEDLINE

## (undated) DEVICE — 4-PORT MANIFOLD: Brand: NEPTUNE 2

## (undated) DEVICE — HOSE CONN FOR WST MGMT SYS NEPTUNE 2